# Patient Record
Sex: FEMALE | Race: WHITE | NOT HISPANIC OR LATINO | URBAN - METROPOLITAN AREA
[De-identification: names, ages, dates, MRNs, and addresses within clinical notes are randomized per-mention and may not be internally consistent; named-entity substitution may affect disease eponyms.]

---

## 2019-11-05 ENCOUNTER — OFFICE VISIT (OUTPATIENT)
Dept: NEUROLOGY | Facility: CLINIC | Age: 49
End: 2019-11-05

## 2019-11-05 VITALS
HEART RATE: 122 BPM | HEIGHT: 64 IN | SYSTOLIC BLOOD PRESSURE: 132 MMHG | OXYGEN SATURATION: 97 % | DIASTOLIC BLOOD PRESSURE: 84 MMHG | WEIGHT: 265 LBS | BODY MASS INDEX: 45.24 KG/M2

## 2019-11-05 DIAGNOSIS — G43.709 CHRONIC MIGRAINE WITHOUT AURA WITHOUT STATUS MIGRAINOSUS, NOT INTRACTABLE: Primary | ICD-10-CM

## 2019-11-05 DIAGNOSIS — G47.00 INSOMNIA, UNSPECIFIED TYPE: ICD-10-CM

## 2019-11-05 PROCEDURE — 99214 OFFICE O/P EST MOD 30 MIN: CPT | Performed by: PSYCHIATRY & NEUROLOGY

## 2019-11-05 RX ORDER — POTASSIUM CHLORIDE 750 MG/1
10 TABLET, FILM COATED, EXTENDED RELEASE ORAL DAILY
Refills: 10 | COMMUNITY
Start: 2019-10-16

## 2019-11-05 RX ORDER — MONTELUKAST SODIUM 10 MG/1
10 TABLET ORAL DAILY
Refills: 10 | COMMUNITY
Start: 2019-10-13

## 2019-11-05 RX ORDER — INDOMETHACIN 50 MG/1
CAPSULE ORAL
Refills: 1 | COMMUNITY
Start: 2019-10-14

## 2019-11-05 RX ORDER — CARBAMAZEPINE 200 MG/1
200 TABLET ORAL 2 TIMES DAILY
Refills: 3 | COMMUNITY
Start: 2019-10-13 | End: 2019-12-19 | Stop reason: HOSPADM

## 2019-11-05 RX ORDER — NARATRIPTAN 2.5 MG/1
TABLET ORAL
Refills: 0 | COMMUNITY
Start: 2019-08-16 | End: 2019-12-19 | Stop reason: SDUPTHER

## 2019-11-05 RX ORDER — LISINOPRIL 20 MG/1
20 TABLET ORAL DAILY
Refills: 10 | COMMUNITY
Start: 2019-10-13 | End: 2020-02-20 | Stop reason: ALTCHOICE

## 2019-11-05 RX ORDER — ROSUVASTATIN CALCIUM 40 MG/1
TABLET, COATED ORAL DAILY
Refills: 2 | COMMUNITY
Start: 2019-10-04

## 2019-11-05 RX ORDER — ALBUTEROL SULFATE 90 UG/1
2 AEROSOL, METERED RESPIRATORY (INHALATION) EVERY 4 HOURS PRN
COMMUNITY

## 2019-11-05 RX ORDER — GALCANEZUMAB 120 MG/ML
INJECTION, SOLUTION SUBCUTANEOUS
Refills: 0 | COMMUNITY
Start: 2019-09-27 | End: 2019-11-12

## 2019-11-05 RX ORDER — TIOTROPIUM BROMIDE 18 UG/1
CAPSULE ORAL; RESPIRATORY (INHALATION)
Refills: 10 | COMMUNITY
Start: 2019-10-15

## 2019-11-05 RX ORDER — TOPIRAMATE 25 MG/1
25 CAPSULE, EXTENDED RELEASE ORAL DAILY
Qty: 30 CAPSULE | Refills: 3 | Status: SHIPPED | OUTPATIENT
Start: 2019-11-05 | End: 2019-12-05

## 2019-11-05 RX ORDER — HYDROCHLOROTHIAZIDE 25 MG/1
25 TABLET ORAL DAILY
Refills: 10 | COMMUNITY
Start: 2019-10-16 | End: 2020-02-20 | Stop reason: ALTCHOICE

## 2019-11-05 RX ORDER — CYCLOBENZAPRINE HCL 10 MG
TABLET ORAL
Refills: 1 | COMMUNITY
Start: 2019-10-16

## 2019-11-05 NOTE — PROGRESS NOTES
Chief Complaint   Patient presents with   • Migraine       Patient ID: Rhonda Dong is a 49 y.o. female.    HPI: I have had the pleasure of seeing your patient today.  His me know she is a 49-year-old female with history of migraine headaches.  She states that she has had at least 21 days of headaches within the last 30 days.  She says that all 21 of them have been migrainous with some aspect of migraine symptoms including sensitivity to light and sound as well as nausea or vomiting.  She has received Aimovig injections 140 mg q. monthly for the past few months now.  She has tried Emgality in the past.  She was not experiencing significant improvement on that.  She does use naratriptan.  She says that the medication does help abort her headaches however it does usually return the next day.  I did notice that she takes Advil PM on a nightly basis.  She states that she has been having a lot of issues with insomnia.  She has had this problem for several years.  She has not seen a sleep specialist.  She has no previous history of sleep apnea.  She has tried Topamax in the past and says that she experience significant side effects so she discontinued that.  That was a few years ago now.  She did not try Trokendi however.  No new headache symptoms.  She was having some symptoms of pain from TMJ which we did try carbamazepine for however she says that that has not helped.  She is taking 200 mg twice daily.    The following portions of the patient's history were reviewed and updated as appropriate: allergies, current medications, past family history, past medical history, past social history, past surgical history and problem list.    Review of Systems   Constitutional: Positive for fatigue. Negative for activity change and appetite change.   HENT: Positive for nosebleeds. Negative for sneezing and trouble swallowing.    Eyes: Positive for photophobia and visual disturbance. Negative for pain.   Respiratory: Negative  for chest tightness, shortness of breath and wheezing.    Cardiovascular: Negative for chest pain, palpitations and leg swelling.   Gastrointestinal: Positive for nausea. Negative for abdominal pain and vomiting.   Endocrine: Negative for cold intolerance, heat intolerance and polydipsia.   Musculoskeletal: Negative for back pain, gait problem and neck pain.   Skin: Negative for color change, rash and wound.   Allergic/Immunologic: Positive for environmental allergies. Negative for food allergies and immunocompromised state.   Neurological: Positive for dizziness. Negative for tremors, seizures, syncope, facial asymmetry, speech difficulty, weakness, light-headedness, numbness and headaches.   Hematological: Negative for adenopathy. Does not bruise/bleed easily.   Psychiatric/Behavioral: Negative for agitation, behavioral problems, confusion, decreased concentration, dysphoric mood, hallucinations, self-injury, sleep disturbance and suicidal ideas. The patient is not nervous/anxious and is not hyperactive.       I reviewed and agree with the above review of systems completed by the medical assistant.    Vitals:    11/05/19 1103   BP: 132/84   Pulse: (!) 122   SpO2: 97%       Neurologic Exam     Mental Status   Oriented to person, place, and time.   Concentration: normal.   Level of consciousness: alert  Knowledge: consistent with education (No deficits found.).     Cranial Nerves     CN II   Visual fields full to confrontation.     CN III, IV, VI   Pupils are equal, round, and reactive to light.  Extraocular motions are normal.   CN III: no CN III palsy  CN VI: no CN VI palsy    CN V   Facial sensation intact.     CN VII   Facial expression full, symmetric.     CN VIII   CN VIII normal.     CN IX, X   CN IX normal.   CN X normal.     CN XI   CN XI normal.     CN XII   CN XII normal.     Motor Exam     Strength   Right neck flexion: 5/5  Left neck flexion: 5/5  Right neck extension: 5/5  Left neck extension:  5/5  Right deltoid: 5/5  Left deltoid: 5/5  Right biceps: 5/5  Left biceps: 5/5  Right triceps: 5/5  Left triceps: 5/5  Right wrist flexion: 5/5  Left wrist flexion: 5/5  Right wrist extension: 5/5  Left wrist extension: 5/5  Right interossei: 5/5  Left interossei: 5/5  Right abdominals: 5/5  Left abdominals: 5/5  Right iliopsoas: 5/5  Left iliopsoas: 5/5  Right quadriceps: 5/5  Left quadriceps: 5/5  Right hamstrin/5  Left hamstrin/5  Right glutei: 5/5  Left glutei: 5/5  Right anterior tibial: 5/5  Left anterior tibial: 5/5  Right posterior tibial: 5/5  Left posterior tibial: 5/5  Right peroneal: 5/5  Left peroneal: 5/5  Right gastroc: 5/5  Left gastroc: 5/5    Sensory Exam   Light touch normal.   Vibration normal.     Gait, Coordination, and Reflexes     Gait  Gait: normal    Reflexes   Right brachioradialis: 2+  Left brachioradialis: 2+  Right biceps: 2+  Left biceps: 2+  Right triceps: 2+  Left triceps: 2+  Right patellar: 2+  Left patellar: 2+  Right achilles: 2+  Left achilles: 2+  Right : 2+  Left : 2+Station is normal.       Physical Exam   Constitutional: She is oriented to person, place, and time. She appears well-developed and well-nourished.   HENT:   Head: Normocephalic and atraumatic.   Eyes: EOM are normal. Pupils are equal, round, and reactive to light.   Cardiovascular: Normal rate and regular rhythm.   Pulmonary/Chest: Breath sounds normal.   Musculoskeletal: Normal range of motion.   Neurological: She is oriented to person, place, and time. Gait normal.   Reflex Scores:       Tricep reflexes are 2+ on the right side and 2+ on the left side.       Bicep reflexes are 2+ on the right side and 2+ on the left side.       Brachioradialis reflexes are 2+ on the right side and 2+ on the left side.       Patellar reflexes are 2+ on the right side and 2+ on the left side.       Achilles reflexes are 2+ on the right side and 2+ on the left side.  Skin: Skin is warm.   Vitals  reviewed.      Procedures    Assessment/Plan: For the chronic headache cycle we will start her on Trokendi to see if we can use this without experiencing significant side effects.  We will start with 25 mg at night.  We will continue the Naratriptan.  We did talk about the Advil PM.  I would like for her to discontinue that if possible and use naproxen instead for headache.  With respect to the insomnia though we will refer her to a sleep specialist.  Return to clinic in 8 weeks.       Rhonda was seen today for migraine.    Diagnoses and all orders for this visit:    Chronic migraine without aura without status migrainosus, not intractable  -     Topiramate ER (TROKENDI XR) 25 MG capsule sustained-release 24 hr; Take 25 mg by mouth Daily for 30 days.    Insomnia, unspecified type  -     Ambulatory Referral to Sleep Medicine           Rodri Iniguez II, MD

## 2019-11-12 ENCOUNTER — TELEPHONE (OUTPATIENT)
Dept: NEUROLOGY | Facility: CLINIC | Age: 49
End: 2019-11-12

## 2019-11-12 DIAGNOSIS — G43.709 CHRONIC MIGRAINE WITHOUT AURA WITHOUT STATUS MIGRAINOSUS, NOT INTRACTABLE: Primary | ICD-10-CM

## 2019-11-12 NOTE — TELEPHONE ENCOUNTER
Pt just seen on 11/05/2019.     Needed refill on her aimovig injections.     Medication sent into pharmacy.

## 2019-12-09 ENCOUNTER — OFFICE VISIT (OUTPATIENT)
Dept: SLEEP MEDICINE | Facility: HOSPITAL | Age: 49
End: 2019-12-09

## 2019-12-09 VITALS
DIASTOLIC BLOOD PRESSURE: 91 MMHG | WEIGHT: 250.4 LBS | BODY MASS INDEX: 42.75 KG/M2 | SYSTOLIC BLOOD PRESSURE: 126 MMHG | HEART RATE: 100 BPM | OXYGEN SATURATION: 95 % | HEIGHT: 64 IN

## 2019-12-09 DIAGNOSIS — R06.83 SNORING: ICD-10-CM

## 2019-12-09 DIAGNOSIS — G47.00 INSOMNIA, UNSPECIFIED TYPE: ICD-10-CM

## 2019-12-09 DIAGNOSIS — G47.10 HYPERSOMNIA: Primary | ICD-10-CM

## 2019-12-09 DIAGNOSIS — E66.01 MORBID OBESITY (HCC): ICD-10-CM

## 2019-12-09 PROCEDURE — G0463 HOSPITAL OUTPT CLINIC VISIT: HCPCS

## 2019-12-09 NOTE — PROGRESS NOTES
[facility]  Telephone: [phone] / Fax: [phone] [place]  Telephone: [phone] / Fax: [phone] Wendy Vazquez    Referring Physician: Earlene Perry APRN  PCP: Earlene Perry APRN    Reason for consult:  sleep apnea    Rhonda Dong is a 49 y.o.female  was seen in the [facility] today for evaluation of sleep apnea.  She reports worsening insomnia in the past few years. She was started on Melatonin and Advil PM-but had to stop due to concern for SE and residual sleep maintenance insomnia.  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 She denies gasping for breath or choking. She denies witnessed apneas. She wakes up with a headache in the morning.  She feels excessively sweaty at night.  Her weight has been fluctuating over the years.  She is not considering any surgical repair for DNS. She takes Flexeril. Her sleep schedule is 11:30pm-5:30am. Her ESS is 5.    SH- [employer], former smoker age [age], 2 tea per day, 2 alcohol per week.    ROS- +recurrent nose bleeds, +ear pain, +hoarseness, +nasal congestion, +myalgias, swollen joints, +GERD, heat intolerance.    Rhonda Dong  has a past medical history of Arthritis, Asthma, CTS (carpal tunnel syndrome), Diabetes mellitus (CMS/HCC), Hypertension, Migraine, and Trigeminal neuralgia.    Current Medications:    Current Outpatient Medications:   •  albuterol sulfate HFA (PROVENTIL HFA) 108 (90 Base) MCG/ACT inhaler, Inhale 2 puffs Every 4 (Four) Hours As Needed for Wheezing., Disp: , Rfl:   •  carBAMazepine (TEGretol) 200 MG tablet, Take 200 mg by mouth 2 (Two) Times a Day., Disp: , Rfl: 3  •  cyclobenzaprine (FLEXERIL) 10 MG tablet, TK 1 T PO TID PRN, Disp: , Rfl: 1  •  Erenumab-aooe (AIMOVIG, 140 MG DOSE,) 70 MG/ML prefilled syringe, Inject 2 mL under the skin into the appropriate area as directed Every 30 (Thirty) Days for 30 days., Disp: 2 mL, Rfl: 5  •  hydroCHLOROthiazide (HYDRODIURIL) 25 MG tablet,  "Take 25 mg by mouth Daily., Disp: , Rfl: 10  •  Ibuprofen-diphenhydrAMINE Cit (ADVIL PM PO), Take  by mouth., Disp: , Rfl:   •  indomethacin (INDOCIN) 50 MG capsule, TK 2 CS PO BID, Disp: , Rfl: 1  •  lisinopril (PRINIVIL,ZESTRIL) 20 MG tablet, Take 20 mg by mouth Daily., Disp: , Rfl: 10  •  Melatonin 3 MG capsule, Take  by mouth., Disp: , Rfl:   •  metFORMIN (GLUCOPHAGE) 500 MG tablet, Take 500 mg by mouth 2 (Two) Times a Day., Disp: , Rfl: 10  •  montelukast (SINGULAIR) 10 MG tablet, Take 10 mg by mouth Daily., Disp: , Rfl: 10  •  naratriptan (AMERGE) 2.5 MG tablet, TK 1 T PO AOS OF HEADACHE. MAY REPEAT 1 TIME IN 4 H, Disp: , Rfl: 0  •  potassium chloride (K-DUR) 10 MEQ CR tablet, Take 10 mEq by mouth Daily., Disp: , Rfl: 10  •  rosuvastatin (CRESTOR) 40 MG tablet, Take  by mouth Daily., Disp: , Rfl: 2  •  SPIRIVA HANDIHALER 18 MCG per inhalation capsule, INL THE CONTENTS OF 1 C VIA INHALATION DEVICE D, Disp: , Rfl: 10    I have reviewed Past Medical History, Past Surgical History, Medication List, Social History and Family History as entered in Sleep Questionnaire and EPIC.    ESS  5   Vital Signs /91   Pulse 100   Ht 162.6 cm (64\")   Wt 114 kg (250 lb 6.4 oz)   SpO2 95%   BMI 42.98 kg/m²  Body mass index is 42.98 kg/m².    General Alert and oriented. No acute distress noted   Pharynx/Throat Class IV Mallampati airway, large tongue, no evidence of redundant lateral pharyngeal tissue. No oral lesions. No thrush. Moist mucous membranes.   Head Normocephalic. Symmetrical. Atraumatic.    Nose No septal deviation. No drainage   Chest Wall Normal shape. Symmetric expansion with respiration. No tenderness.   Neck Trachea midline, no thyromegaly or adenopathy    Lungs Clear to auscultation bilaterally. No wheezes. No rhonchi. No rales. Respirations regular, even and unlabored.   Heart Regular rhythm and normal rate. Normal S1 and S2. No murmur   Abdomen Soft, non-tender and non-distended. Normal bowel sounds. " No masses.   Extremities Moves all extremities well. No edema   Psychiatric Normal mood and affect.       Impression:  1. Hypersomnia    2. Insomnia, unspecified type    3. Snoring    4. Morbid obesity (CMS/HCC)          Plan:  I discussed the pathophysiology of obstructive sleep apnea with the patient.  We discussed the adverse outcomes associated with untreated sleep-disordered breathing.  We discussed treatment modalities of obstructive sleep apnea including CPAP device as well as oral mandibular advancement device. Sleep study will be scheduled to establish definitive diagnosis of sleep disorder breathing.  Weight loss will be strongly beneficial in order to reduce the severity of sleep-disordered breathing.  Patient has narrow oropharyngeal structure.  Caution during activities that require prolonged concentration is strongly advised.  Patient will be notified of sleep study results after sleep study is completed.  If sleep apnea is only mild,  oral mandibular advancement device may be one of the treatment options.  However if sleep apnea is moderately severe, CPAP treatment will be strongly encouraged.  The patient is not opposed to treatment with CPAP device if we confirm significant obstructive sleep apnea on polysomnography.     If HST is negative for KRUPA-recommend CBT-I by Dr. Sanchez.    Thank you for allowing me to participate in your patient's care.    The patient will follow up with Dr. Short after completion of polysomnography.    CLAY Deal  [facility]  Phone: [phone]      Part of this note may be an electronic transcription/translation of spoken language to printed text using the Dragon Dictation System. Some errors may exist even though the document was edited.

## 2019-12-19 ENCOUNTER — OFFICE VISIT (OUTPATIENT)
Dept: NEUROLOGY | Facility: CLINIC | Age: 49
End: 2019-12-19

## 2019-12-19 VITALS
DIASTOLIC BLOOD PRESSURE: 88 MMHG | OXYGEN SATURATION: 98 % | BODY MASS INDEX: 43.02 KG/M2 | HEART RATE: 105 BPM | HEIGHT: 64 IN | SYSTOLIC BLOOD PRESSURE: 138 MMHG | WEIGHT: 252 LBS

## 2019-12-19 DIAGNOSIS — G43.009 MIGRAINE WITHOUT AURA AND WITHOUT STATUS MIGRAINOSUS, NOT INTRACTABLE: Primary | ICD-10-CM

## 2019-12-19 DIAGNOSIS — M26.622 ARTHRALGIA OF LEFT TEMPOROMANDIBULAR JOINT: ICD-10-CM

## 2019-12-19 PROCEDURE — 99214 OFFICE O/P EST MOD 30 MIN: CPT | Performed by: NURSE PRACTITIONER

## 2019-12-19 RX ORDER — RANITIDINE 150 MG/1
150 TABLET ORAL DAILY
COMMUNITY
Start: 2019-10-01 | End: 2021-03-04 | Stop reason: HOSPADM

## 2019-12-19 RX ORDER — NARATRIPTAN 2.5 MG/1
2.5 TABLET ORAL ONCE AS NEEDED
Qty: 9 TABLET | Refills: 0 | Status: SHIPPED | OUTPATIENT
Start: 2019-12-19 | End: 2020-08-27 | Stop reason: SDUPTHER

## 2019-12-19 RX ORDER — ERENUMAB-AOOE 140 MG/ML
INJECTION, SOLUTION SUBCUTANEOUS
COMMUNITY
Start: 2019-12-12 | End: 2020-05-18

## 2019-12-19 RX ORDER — LAMOTRIGINE 25 MG/1
TABLET ORAL
Qty: 54 TABLET | Refills: 2 | Status: SHIPPED | OUTPATIENT
Start: 2019-12-19 | End: 2020-01-13

## 2019-12-19 RX ORDER — OMEPRAZOLE 20 MG/1
20 CAPSULE, DELAYED RELEASE ORAL 2 TIMES DAILY
COMMUNITY
Start: 2019-10-01

## 2019-12-19 NOTE — PROGRESS NOTES
DOS: 2019  NAME: Rhonda Dong   : 1970  PCP: Earlene Perry APRN    Chief Complaint   Patient presents with   • Migraine      SUBJECTIVE  Neurological Problem:  49 y.o. RHW female with migraines, TMJ, DM, HTN, HLD, asthma, chronic right knee pain and h/o uveitis, cataracts and CTS (s/p surgery on the right) who presents today for f/u of migraines and TMJ (on left). She is unaccompanied. Patient and problem are new to examiner. History is provided by patient and review of records that are summarized below.     Interval History:   Ms. Dong has been previously treated by Dr. Iniguez, Review of records from back to 2016 were reviewed. She has evidently been on  2016: Zonegran, Zomig, Riboflavin, magnesium, Medrol dosepak, imitrex for abortive, Lyrica started  2017: Lyrica, trial of cambia, Tegretol 200 mg for TMJ, Amitriptyline, naratriptan for abortive, medrol dosepak  2018: Zonegran, sumatriptain, naratriptan, Tegretol, Aimovig 140 and imitrex (that was discontinued d/t dizziness); Also with reports of having tried propanolol and cymbalta   2019: Continued on Aimovig 140 mg, naratriptan (for migraines)  and Tegretol (for TMJ). Due to insurance reasons, had to switch to emgality with worsening of migraine days.     She was last seen by Dr. Iniguez in follow-up in 2019 reporting at least 21 days of headaches in the last 30 days, features consistent with migraines and being treated with Aimovig 140 mg and naratriptan for abortive. She also continued on Tegretol 200 mg BID for her TMJ without improvement. Trokendi was added, she was instructed to d/c Advil PM and use naproxen and to see sleep specialist.     She presents today and continues on Aimovig for prevention and naratriptan for abortive with good control over the previous month, reporting only 3 migraines. Of more concern today is her continued TMJ pain. She was tried on topamax for TMJ but had adverse side-effects and transitioned to  Vivek at her last appointment but she then had similar side effects and she did not tolerate.   She has also reports trying neurontin and lyrica. She has been using muscle relaxants to help mitigate TMJ pain as well per her PCP. [facility] and [facility] bite block. She follows up with her PCP four routine lab work, review of most recent labs done in June show an A1C of 6.1, CMP essentially normal, lipid panel with elevated TG, CBC unremarkable, TSH 1.78, B12 629, Vit D low at 18.6 (PCP recommended supplementation).     She denies smoking (20-year history). Rare alcohol use.   Family history, mom with migraines and son with migraines.     Review of Systems:Review of Systems   Constitutional: Negative for activity change, appetite change and fatigue.   HENT: Negative for ear pain, facial swelling and trouble swallowing.    Eyes: Negative for photophobia, pain and visual disturbance.   Musculoskeletal: Negative for back pain, gait problem and neck pain.   Allergic/Immunologic: Negative for environmental allergies, food allergies and immunocompromised state.   Neurological: Positive for headaches (down to 3 migraines a month). Negative for dizziness, tremors, seizures, syncope, facial asymmetry, speech difficulty, weakness, light-headedness and numbness.   Hematological: Negative for adenopathy. Does not bruise/bleed easily.   Psychiatric/Behavioral: Negative for agitation, behavioral problems, confusion, decreased concentration, dysphoric mood, hallucinations, self-injury, sleep disturbance and suicidal ideas. The patient is not nervous/anxious and is not hyperactive.     Above ROS reviewed    The following portions of the patient's history were reviewed and updated as appropriate: allergies, current medications, past family history, past medical history, past social history, past surgical history and problem list.    Current Medications:   Current Outpatient Medications:   •  AIMOVIG 140  MG/ML solution auto-injector, INJECT 1 ML UNDER THE SKIN Q 30 DAYS, Disp: , Rfl:   •  albuterol sulfate HFA (PROVENTIL HFA) 108 (90 Base) MCG/ACT inhaler, Inhale 2 puffs Every 4 (Four) Hours As Needed for Wheezing., Disp: , Rfl:   •  cyclobenzaprine (FLEXERIL) 10 MG tablet, TK 1 T PO TID PRN, Disp: , Rfl: 1  •  hydroCHLOROthiazide (HYDRODIURIL) 25 MG tablet, Take 25 mg by mouth Daily., Disp: , Rfl: 10  •  indomethacin (INDOCIN) 50 MG capsule, TK 2 CS PO BID, Disp: , Rfl: 1  •  lisinopril (PRINIVIL,ZESTRIL) 20 MG tablet, Take 20 mg by mouth Daily., Disp: , Rfl: 10  •  metFORMIN (GLUCOPHAGE) 500 MG tablet, Take 500 mg by mouth 2 (Two) Times a Day., Disp: , Rfl: 10  •  montelukast (SINGULAIR) 10 MG tablet, Take 10 mg by mouth Daily., Disp: , Rfl: 10  •  naratriptan (AMERGE) 2.5 MG tablet, Take 1 tablet by mouth 1 (One) Time As Needed for Migraine for up to 1 dose. 2.5 mg at onset of headache, may repeat in 4 hours if needed, Disp: 9 tablet, Rfl: 0  •  omeprazole (priLOSEC) 20 MG capsule, Take 20 mg by mouth Daily., Disp: , Rfl:   •  potassium chloride (K-DUR) 10 MEQ CR tablet, Take 10 mEq by mouth Daily., Disp: , Rfl: 10  •  raNITIdine (ZANTAC) 150 MG tablet, Take 150 mg by mouth Daily., Disp: , Rfl:   •  rosuvastatin (CRESTOR) 40 MG tablet, Take  by mouth Daily., Disp: , Rfl: 2  •  SPIRIVA HANDIHALER 18 MCG per inhalation capsule, INL THE CONTENTS OF 1 C VIA INHALATION DEVICE D, Disp: , Rfl: 10  •  lamoTRIgine (LAMICTAL) 25 MG tablet, Take 1 tablet by mouth x 1 week and increase by 25 mg every week for 3 weeks., Disp: 54 tablet, Rfl: 2      OBJECTIVE  Vitals:    12/19/19 0747   BP: 138/88   Pulse: 105   SpO2: 98%     Body mass index is 43.23 kg/m².    Diagnostics:    Laboratory Results:           Physical Examination:   General Appearance:   Well developed, overweight, well groomed, alert, and cooperative.  HEENT: Normocephalic.    Neck and Spine: Normal range of motion.  Normal alignment. No mass or tenderness. No  bruits.  Cardiac: Regular rate and rhythm.   Peripheral Vasculature: Radial pulses are equal and symmetric. No signs of distal embolization.  Extremities:    No edema or deformities. Normal joint ROM.  Skin:    No rashes or birth marks.  Psychiatric:    Normal mood and affect.    Neurological examination:  Higher Integrative  Function: Oriented to time, place and person. Normal registration, recall (3/3), attention span and concentration. Normal language including comprehension, spontaneous speech, repetition, reading, naming and vocabulary. No neglect. Normal fund of knowledge and higher integrative function.  CN II: Pupils are equal, round, and reactive to light. Normal visual acuity and visual fields.    CN III IV VI: Extraocular movements are full without nystagmus.   CN V: Normal facial sensation and strength of muscles of mastication.  CN VII: Facial movements are symmetric. No weakness.  CN VIII:   Auditory acuity is normal.  CN IX & X:   Symmetric palatal movement.  CN XI: Sternocleidomastoid and trapezius are normal.  No weakness.  CN XII:   The tongue is midline.  No atrophy or fasciculations.  Motor: Normal muscle strength, bulk and tone in upper and lower extremities.  No fasciculations, rigidity, spasticity, or abnormal movements.  Reflexes: 2+ in the upper and lower extremities.   Sensation: Normal to light touch, vibration, temperature, and proprioception in arms and legs.   Station and Gait: Normal gait and station.    Coordination: Finger to nose test shows no dysmetria.  Heel to shin normal.    Impression:  Ms. Dong presents for f/u of migraines and TMJ. She has tried multiple medications (listed above) for migraine therapy but is doing well currently on Aimovig 140 mg for prevention and naratriptan for abortive therapy. She unfortunately has failed Tegretol and  topiramate/Trokendi for TMJ relief. Will trial lamictal, reviewed possible side-effects including Arnaud Mike's syndrome. She was  instructed to discontinue if she develops a rash. Would also consider complementary therapies, possibly compounded topicals. She will f/u in 2 months.     Plan:     1. Chronic migraine w/o aura  Aimovig 140 mg for prevention  Naratriptan 2.5 mg prn for abortive therapy  Avoid migraine triggers  Keep well hydrated    2. TMJ pain  Muscle relaxants per PCP  Trial of lamictal 25 mg qd, increase by 25 mg q weekly  Counseled on possible side-effects, to D/C if signs of rash    F/U in 2 months  There are no diagnoses linked to this encounter.    Coding      Dictated using Dragon

## 2020-01-06 DIAGNOSIS — G47.33 OSA (OBSTRUCTIVE SLEEP APNEA): Primary | ICD-10-CM

## 2020-01-13 ENCOUNTER — TELEPHONE (OUTPATIENT)
Dept: NEUROLOGY | Facility: CLINIC | Age: 50
End: 2020-01-13

## 2020-01-13 RX ORDER — LAMOTRIGINE 100 MG/1
100 TABLET ORAL DAILY
Qty: 30 TABLET | Refills: 2 | Status: SHIPPED | OUTPATIENT
Start: 2020-01-13 | End: 2020-04-13 | Stop reason: SDUPTHER

## 2020-01-13 NOTE — TELEPHONE ENCOUNTER
Pt sent msg stating that the lamictal is doing good and that she is up to 3 pills, but is asking/stating that she believes it should be upped to four. She is currently on 75 mg's daily. Last seen in office 12/19/19 and was instructed to increase 25 mg lamictal by one pill every week for 3 weeks.     Please review and advise.

## 2020-01-15 ENCOUNTER — TELEPHONE (OUTPATIENT)
Dept: SLEEP MEDICINE | Facility: HOSPITAL | Age: 50
End: 2020-01-15

## 2020-01-16 NOTE — TELEPHONE ENCOUNTER
Tech called home #, no answer. Left vm for patient inquiring if would be able to make it in. Tech left office number to call or if needed to reschedule. No show letter sent as well. MAB

## 2020-02-14 ENCOUNTER — TELEPHONE (OUTPATIENT)
Dept: NEUROLOGY | Facility: CLINIC | Age: 50
End: 2020-02-14

## 2020-02-20 ENCOUNTER — OFFICE VISIT (OUTPATIENT)
Dept: NEUROLOGY | Facility: CLINIC | Age: 50
End: 2020-02-20

## 2020-02-20 VITALS
SYSTOLIC BLOOD PRESSURE: 116 MMHG | OXYGEN SATURATION: 99 % | HEIGHT: 64 IN | DIASTOLIC BLOOD PRESSURE: 84 MMHG | BODY MASS INDEX: 43.02 KG/M2 | HEART RATE: 101 BPM | WEIGHT: 252 LBS

## 2020-02-20 DIAGNOSIS — M26.622 ARTHRALGIA OF LEFT TEMPOROMANDIBULAR JOINT: ICD-10-CM

## 2020-02-20 DIAGNOSIS — G43.009 MIGRAINE WITHOUT AURA AND WITHOUT STATUS MIGRAINOSUS, NOT INTRACTABLE: Primary | ICD-10-CM

## 2020-02-20 PROCEDURE — 99213 OFFICE O/P EST LOW 20 MIN: CPT | Performed by: NURSE PRACTITIONER

## 2020-02-20 RX ORDER — LISINOPRIL AND HYDROCHLOROTHIAZIDE 25; 20 MG/1; MG/1
1 TABLET ORAL DAILY
COMMUNITY

## 2020-02-20 NOTE — PROGRESS NOTES
DOS: 2020  NAME: Rhonda Dong   : 1970  PCP: Mellisa Larry APRN    Chief Complaint   Patient presents with   • Migraine      SUBJECTIVE  Neurological Problem:  49 y.o. RHW female with migraines, TMJ, DM, HTN, HLD, asthma, chronic right knee pain and h/o uveitis, b/l cataracts and CTS (s/p surgery on the right) who presents today for f/u of migraines and TMJ (on left). She is unaccompanied.     Interval History:   **For full history please see progress note dated 19.    Ms. Dong presents for f/u of migraines and TMJ. She has tried multiple medications in the past for migraine therapy but is doing well currently on Aimovig 140 mg for prevention and naratriptan for abortive therapy.  Reports having only 2 migraines in the last month that responded well to naratriptan. For TMJ, she wears a bite block, has failed Tegretol and topiramate/Trokendi and was started on Lamictal at her last visit in December, titrated up to 100 mg daily and reports significant improvement.  She denies any side effects, no rash.    She is currently being followed by orthopedics for right knee pain, likely needing surgery.  She follows up regularly with ophthalmology for her history of uveitis.  She has established with a nutritionist and has lost weight since her last visit, denies any changes in her health since her last visit.  Overall states she is doing very well.    She follows up with her PCP four routine lab work, review of most recent labs done in 2019 show an A1C of 6.1, CMP essentially normal, lipid panel with elevated TG, CBC unremarkable, TSH 1.78, B12 629, Vit D low at 18.6 (PCP recommended supplementation).      Review of Systems:Review of Systems   Constitutional: Negative for activity change, appetite change and fatigue.   HENT: Negative for ear pain, facial swelling and trouble swallowing.    Eyes: Negative for photophobia, pain and visual disturbance.   Musculoskeletal: Negative for back  pain, gait problem and neck pain.   Allergic/Immunologic: Negative for environmental allergies, food allergies and immunocompromised state.   Neurological: Negative for dizziness, tremors, seizures, syncope, facial asymmetry, speech difficulty, weakness, light-headedness, numbness and headaches (1-2 monthly).   Hematological: Negative for adenopathy. Does not bruise/bleed easily.   Psychiatric/Behavioral: Negative for agitation, behavioral problems, confusion, decreased concentration, dysphoric mood, hallucinations, self-injury, sleep disturbance and suicidal ideas. The patient is not nervous/anxious and is not hyperactive.     Above ROS reviewed    The following portions of the patient's history were reviewed and updated as appropriate: allergies, current medications, past family history, past medical history, past social history, past surgical history and problem list.    Current Medications:   Current Outpatient Medications:   •  AIMOVIG 140 MG/ML solution auto-injector, INJECT 1 ML UNDER THE SKIN Q 30 DAYS, Disp: , Rfl:   •  albuterol sulfate HFA (PROVENTIL HFA) 108 (90 Base) MCG/ACT inhaler, Inhale 2 puffs Every 4 (Four) Hours As Needed for Wheezing., Disp: , Rfl:   •  cyclobenzaprine (FLEXERIL) 10 MG tablet, TK 1 T PO TID PRN, Disp: , Rfl: 1  •  indomethacin (INDOCIN) 50 MG capsule, TK 2 CS PO BID, Disp: , Rfl: 1  •  lamoTRIgine (LAMICTAL) 100 MG tablet, Take 1 tablet by mouth Daily., Disp: 30 tablet, Rfl: 2  •  lisinopril-hydrochlorothiazide (PRINZIDE,ZESTORETIC) 20-25 MG per tablet, Take 1 tablet by mouth Daily., Disp: , Rfl:   •  metFORMIN (GLUCOPHAGE) 500 MG tablet, Take 500 mg by mouth 2 (Two) Times a Day., Disp: , Rfl: 10  •  montelukast (SINGULAIR) 10 MG tablet, Take 10 mg by mouth Daily., Disp: , Rfl: 10  •  naratriptan (AMERGE) 2.5 MG tablet, Take 1 tablet by mouth 1 (One) Time As Needed for Migraine for up to 1 dose. 2.5 mg at onset of headache, may repeat in 4 hours if needed, Disp: 9 tablet, Rfl:  0  •  omeprazole (priLOSEC) 20 MG capsule, Take 20 mg by mouth Daily., Disp: , Rfl:   •  potassium chloride (K-DUR) 10 MEQ CR tablet, Take 10 mEq by mouth Daily., Disp: , Rfl: 10  •  raNITIdine (ZANTAC) 150 MG tablet, Take 150 mg by mouth Daily., Disp: , Rfl:   •  rosuvastatin (CRESTOR) 40 MG tablet, Take  by mouth Daily., Disp: , Rfl: 2  •  SPIRIVA HANDIHALER 18 MCG per inhalation capsule, INL THE CONTENTS OF 1 C VIA INHALATION DEVICE D, Disp: , Rfl: 10      OBJECTIVE  Vitals:    02/20/20 0802   BP: 116/84   Pulse: 101   SpO2: 99%     Body mass index is 43.23 kg/m².    Diagnostics:    Laboratory Results:           Physical Exam:  GENERAL: NAD, obese  HEENT: Normocephalic, atraumatic   COR: RRR  Resp: Even and unlabored  Extremities: Decreased ROM of right knee  Psychiatric: Normal mood and affect.    Neurological:   MS: AO. Language normal. No neglect. Higher integrative function normal  CN: II-XII normal  Motor: Normal strength and tone throughout.  Sensory: Intact to light touch in arms and legs  Station and Gait: Normal gait and station.    Coordination: Normal finger to nose.    Impression:  Ms. Dong presents for f/u of migraines and TMJ. She has tried multiple medications (listed above) for migraine therapy but is doing well currently on Aimovig 140 mg for prevention and naratriptan for abortive therapy.  In regards to her TMJ, she has responded well to Lamictal, currently at 100 mg daily.  She will continue current medication regimen.  Will request most recent lab work from her PCP.  She will follow-up here in 6 months, sooner if symptoms warrant.      Plan:     1. Chronic migraine w/o aura  Aimovig 140 mg for prevention  Naratriptan 2.5 mg prn for abortive therapy  Avoid migraine triggers  Keep well hydrated     2. TMJ pain  Muscle relaxants per PCP  Continue Lamictal 100 mg daily    F/U in 6 months.  Rhonda was seen today for migraine.    Diagnoses and all orders for this visit:    Migraine without  aura and without status migrainosus, not intractable    Arthralgia of left temporomandibular joint        Coding      Dictated using Dragon

## 2020-04-13 RX ORDER — LAMOTRIGINE 100 MG/1
100 TABLET ORAL DAILY
Qty: 30 TABLET | Refills: 2 | Status: SHIPPED | OUTPATIENT
Start: 2020-04-13 | End: 2020-07-13 | Stop reason: SDUPTHER

## 2020-04-13 NOTE — TELEPHONE ENCOUNTER
Pt last seen 2/20/2020. Follow up scheduled 8/20/2020.       Quantity 30 for 30 days.      Please review and approve or advise.     Thank you.

## 2020-05-18 RX ORDER — ERENUMAB-AOOE 140 MG/ML
INJECTION, SOLUTION SUBCUTANEOUS
Qty: 1 ML | Refills: 3 | Status: SHIPPED | OUTPATIENT
Start: 2020-05-18 | End: 2020-09-03

## 2020-07-13 RX ORDER — LAMOTRIGINE 100 MG/1
100 TABLET ORAL DAILY
Qty: 30 TABLET | Refills: 2 | Status: SHIPPED | OUTPATIENT
Start: 2020-07-13 | End: 2020-11-09 | Stop reason: SDUPTHER

## 2020-08-27 ENCOUNTER — OFFICE VISIT (OUTPATIENT)
Dept: NEUROLOGY | Facility: CLINIC | Age: 50
End: 2020-08-27

## 2020-08-27 VITALS
OXYGEN SATURATION: 99 % | DIASTOLIC BLOOD PRESSURE: 88 MMHG | SYSTOLIC BLOOD PRESSURE: 138 MMHG | BODY MASS INDEX: 40.97 KG/M2 | HEIGHT: 64 IN | WEIGHT: 240 LBS | HEART RATE: 101 BPM

## 2020-08-27 DIAGNOSIS — M26.622 ARTHRALGIA OF LEFT TEMPOROMANDIBULAR JOINT: ICD-10-CM

## 2020-08-27 DIAGNOSIS — G47.00 INSOMNIA, UNSPECIFIED TYPE: ICD-10-CM

## 2020-08-27 DIAGNOSIS — G43.009 MIGRAINE WITHOUT AURA AND WITHOUT STATUS MIGRAINOSUS, NOT INTRACTABLE: Primary | ICD-10-CM

## 2020-08-27 PROCEDURE — 99214 OFFICE O/P EST MOD 30 MIN: CPT | Performed by: NURSE PRACTITIONER

## 2020-08-27 RX ORDER — NARATRIPTAN 2.5 MG/1
5 TABLET ORAL ONCE AS NEEDED
Qty: 9 TABLET | Refills: 5 | Status: SHIPPED | OUTPATIENT
Start: 2020-08-27 | End: 2021-12-02 | Stop reason: SDUPTHER

## 2020-08-27 RX ORDER — OXYCODONE HYDROCHLORIDE AND ACETAMINOPHEN 5; 325 MG/1; MG/1
TABLET ORAL
COMMUNITY
Start: 2020-06-13 | End: 2020-08-27

## 2020-08-27 RX ORDER — APIXABAN 2.5 MG/1
2.5 TABLET, FILM COATED ORAL 2 TIMES DAILY
COMMUNITY
Start: 2020-06-13 | End: 2020-08-27

## 2020-08-27 RX ORDER — ACETAMINOPHEN 500 MG
500 TABLET ORAL EVERY 6 HOURS PRN
COMMUNITY

## 2020-08-27 RX ORDER — NORTRIPTYLINE HYDROCHLORIDE 25 MG/1
25 CAPSULE ORAL NIGHTLY
Qty: 30 CAPSULE | Refills: 0 | Status: SHIPPED | OUTPATIENT
Start: 2020-08-27 | End: 2020-09-28 | Stop reason: SDUPTHER

## 2020-08-27 NOTE — PROGRESS NOTES
DOS: 2020  NAME: Rhonda Dong   : 1970  PCP: Mellisa Larry APRN    Chief Complaint   Patient presents with   • Migraine      SUBJECTIVE  Neurological Problem:  50 y.o. RHW female with migraines, TMJ, DM, HTN, HLD, asthma, chronic right knee pain and h/o uveitis, b/l cataracts and CTS (s/p surgery on the right) who presents today for f/u of migraines and TMJ (on left). She is unaccompanied.     Interval History:   **For full history please see progress note dated 19.     Ms. Dong presents for f/u of migraines and TMJ. She has tried multiple medications in the past for migraine therapy but has done well on Aimovig 140 mg for prevention and naratriptan for abortive therapy.  For TMJ, she wears a bite block, has failed Tegretol and topiramate/Trokendi and was started on Lamictal  in December, titrated up to 100 mg daily, no adverse effects, no rash.  She was last seen in 2020, doing well on the above regimen.    She presents today, had a recent right knee replacement, doing well, back to work and regular activties.  She reports having some difficulty with sleep, has tried meditation, good sleep hygiene habits and OTCs including melatonin, Tylenol PM. Also some worsening of migraines, increased frequency, had to use rescue mediction 6 times in last two months, on one occaision had to take three dose, thinks it is tied to sleep issues. Was previously tested for KRUPA, was negative. Plans to discuss insomnia with PCP.  Regarding her TMJ, doing well on Lamictal at 100 mg daily.  She denies any other changes in her health since her last visit.  She has changed her diet to a Mediterranean diet with the help of a nutritionist, is using [employer].    Review of Systems:Review of Systems   Constitutional: Negative for activity change, appetite change and fatigue.   HENT: Negative for ear pain, tinnitus and trouble swallowing.    Eyes: Positive for pain. Negative for photophobia and visual  disturbance.   Musculoskeletal: Positive for neck pain. Negative for back pain and gait problem.   Neurological: Positive for headaches. Negative for dizziness, tremors, seizures, syncope, facial asymmetry, speech difficulty, weakness, light-headedness and numbness.   Psychiatric/Behavioral: Positive for sleep disturbance. Negative for agitation, behavioral problems, confusion, decreased concentration, dysphoric mood, hallucinations, self-injury and suicidal ideas. The patient is not nervous/anxious and is not hyperactive.     Above ROS reviewed    The following portions of the patient's history were reviewed and updated as appropriate: allergies, current medications, past family history, past medical history, past social history, past surgical history and problem list.    Current Medications:   Current Outpatient Medications:   •  acetaminophen (TYLENOL) 500 MG tablet, Take 500 mg by mouth Every 6 (Six) Hours As Needed for Mild Pain ., Disp: , Rfl:   •  AIMOVIG 140 MG/ML solution auto-injector, INJECT 1 ML UNDER THE SKIN EVERY 30 DAYS, Disp: 1 mL, Rfl: 3  •  albuterol sulfate HFA (PROVENTIL HFA) 108 (90 Base) MCG/ACT inhaler, Inhale 2 puffs Every 4 (Four) Hours As Needed for Wheezing., Disp: , Rfl:   •  cyclobenzaprine (FLEXERIL) 10 MG tablet, TK 1 T PO TID PRN, Disp: , Rfl: 1  •  indomethacin (INDOCIN) 50 MG capsule, TK 2 CS PO BID, Disp: , Rfl: 1  •  lamoTRIgine (LaMICtal) 100 MG tablet, Take 1 tablet by mouth Daily., Disp: 30 tablet, Rfl: 2  •  lisinopril-hydrochlorothiazide (PRINZIDE,ZESTORETIC) 20-25 MG per tablet, Take 1 tablet by mouth Daily., Disp: , Rfl:   •  metFORMIN (GLUCOPHAGE) 500 MG tablet, Take 500 mg by mouth 2 (Two) Times a Day., Disp: , Rfl: 10  •  montelukast (SINGULAIR) 10 MG tablet, Take 10 mg by mouth Daily., Disp: , Rfl: 10  •  naratriptan (AMERGE) 2.5 MG tablet, Take 2 tablets by mouth 1 (One) Time As Needed for Migraine. 2.5 mg at onset of headache, may repeat in 4 hours if needed, Disp: 9  tablet, Rfl: 5  •  omeprazole (priLOSEC) 20 MG capsule, Take 20 mg by mouth Daily., Disp: , Rfl:   •  potassium chloride (K-DUR) 10 MEQ CR tablet, Take 10 mEq by mouth Daily., Disp: , Rfl: 10  •  raNITIdine (ZANTAC) 150 MG tablet, Take 150 mg by mouth Daily., Disp: , Rfl:   •  rosuvastatin (CRESTOR) 40 MG tablet, Take  by mouth Daily., Disp: , Rfl: 2  •  SPIRIVA HANDIHALER 18 MCG per inhalation capsule, INL THE CONTENTS OF 1 C VIA INHALATION DEVICE D, Disp: , Rfl: 10  •  ELIQUIS 2.5 MG tablet tablet, Take 2.5 mg by mouth 2 (Two) Times a Day., Disp: , Rfl:   •  nortriptyline (PAMELOR) 25 MG capsule, Take 1 capsule by mouth Every Night for 30 days., Disp: 30 capsule, Rfl: 0  •  oxyCODONE-acetaminophen (PERCOCET) 5-325 MG per tablet, TK 1-2 TS PO Q 4 H PRN P, Disp: , Rfl:   **I did not stop or change the above medications.  Patient's medication list was updated to reflect medications they have reported as currently taking, including medication changes made by other providers.    OBJECTIVE  Vitals:    08/27/20 0928   BP: 138/88   Pulse: 101   SpO2: 99%     Body mass index is 41.18 kg/m².    Diagnostics:    Laboratory Results:         [facility]: Glucose 103, BUN 28, creatinine 1.21, sodium 136, potassium 4.2,      Physical Exam:  GENERAL: NAD, obese  HEENT: Normocephalic, atraumatic   COR: RRR  Resp: Even and unlabored  Extremities: Right knee with fresh surgical scar, well healed   Psychiatric: Normal mood and affect.    Neurological:   MS: AO. Language normal. No neglect. Follows all commands.  CN: II-XII grossly normal  Motor: Normal strength and tone throughout.  Sensory: Intact to light touch in arms and legs  Station and Gait: Normal gait and station.    Coordination: Normal finger to nose bilaterally    Impression:  Ms. Dong presents for f/u of migraines and TMJ, doing fairly well on current regimen of Aimovig 140 mg for prevention and naratriptan for abortive therapy; however, experiencing  an increase in frequency of migraines in the last several months likely tied to poor sleep.  Will trial nortriptyline 25 mg nightly for prevention as the sedating effects may help her sleep as well as her migraines.  She also plans to follow-up with her PCP for insomnia.  She will follow-up here in 6 months, sooner if symptoms warrant.    Plan:     1.  Chronic migraine w/o aura  Trial of nortriptyline 25 mg qhs  Aimovig 140 mg monthly for prevention  Naratriptan 5 mg prn for abortive therapy  Avoid migraine triggers  Keep well hydrated    2. TMJ pain  Muscle relaxants per PCP  Continue lamictal 100 mg daily    F/U in 6 months    Greater than 50% of this 25-minute visit was spent counseling patient regarding diagnosis, review of diagnostics, medication treatment options including purpose, risk, benefits, possible side effects as well as recommended lifestyle modifications and preventative measures.  Rhonda was seen today for migraine.    Diagnoses and all orders for this visit:    Migraine without aura and without status migrainosus, not intractable    Other orders  -     nortriptyline (PAMELOR) 25 MG capsule; Take 1 capsule by mouth Every Night for 30 days.  -     naratriptan (AMERGE) 2.5 MG tablet; Take 2 tablets by mouth 1 (One) Time As Needed for Migraine. 2.5 mg at onset of headache, may repeat in 4 hours if needed        Coding      Dictated using Dragon

## 2020-09-03 RX ORDER — ERENUMAB-AOOE 140 MG/ML
INJECTION, SOLUTION SUBCUTANEOUS
Qty: 1 PEN | Refills: 5 | Status: SHIPPED | OUTPATIENT
Start: 2020-09-03 | End: 2021-03-04 | Stop reason: SDUPTHER

## 2020-09-29 RX ORDER — NORTRIPTYLINE HYDROCHLORIDE 25 MG/1
25 CAPSULE ORAL NIGHTLY
Qty: 30 CAPSULE | Refills: 4 | Status: SHIPPED | OUTPATIENT
Start: 2020-09-29 | End: 2021-02-23 | Stop reason: SDUPTHER

## 2020-11-09 RX ORDER — LAMOTRIGINE 100 MG/1
100 TABLET ORAL DAILY
Qty: 30 TABLET | Refills: 2 | Status: SHIPPED | OUTPATIENT
Start: 2020-11-09 | End: 2021-02-01 | Stop reason: SDUPTHER

## 2020-11-09 NOTE — TELEPHONE ENCOUNTER
Pt last seen 8/27/2020. Follow up scheduled 3/4/2021.       Quantity 30 for 30 days.      Please review and approve or advise.     Thank you.

## 2021-02-01 RX ORDER — LAMOTRIGINE 100 MG/1
100 TABLET ORAL DAILY
Qty: 30 TABLET | Refills: 2 | Status: SHIPPED | OUTPATIENT
Start: 2021-02-01 | End: 2021-05-03 | Stop reason: SDUPTHER

## 2021-02-23 RX ORDER — NORTRIPTYLINE HYDROCHLORIDE 25 MG/1
25 CAPSULE ORAL NIGHTLY
Qty: 30 CAPSULE | Refills: 1 | Status: SHIPPED | OUTPATIENT
Start: 2021-02-23 | End: 2021-03-04

## 2021-03-04 ENCOUNTER — OFFICE VISIT (OUTPATIENT)
Dept: NEUROLOGY | Facility: CLINIC | Age: 51
End: 2021-03-04

## 2021-03-04 VITALS
SYSTOLIC BLOOD PRESSURE: 126 MMHG | BODY MASS INDEX: 43.02 KG/M2 | DIASTOLIC BLOOD PRESSURE: 90 MMHG | HEIGHT: 64 IN | WEIGHT: 252 LBS | HEART RATE: 86 BPM | OXYGEN SATURATION: 98 %

## 2021-03-04 DIAGNOSIS — E66.01 MORBIDLY OBESE (HCC): ICD-10-CM

## 2021-03-04 DIAGNOSIS — M26.622 ARTHRALGIA OF LEFT TEMPOROMANDIBULAR JOINT: ICD-10-CM

## 2021-03-04 DIAGNOSIS — G43.709 CHRONIC MIGRAINE WITHOUT AURA WITHOUT STATUS MIGRAINOSUS, NOT INTRACTABLE: Primary | ICD-10-CM

## 2021-03-04 PROCEDURE — 99214 OFFICE O/P EST MOD 30 MIN: CPT | Performed by: NURSE PRACTITIONER

## 2021-03-04 RX ORDER — NORTRIPTYLINE HYDROCHLORIDE 50 MG/1
50 CAPSULE ORAL NIGHTLY
Qty: 30 CAPSULE | Refills: 5 | Status: SHIPPED | OUTPATIENT
Start: 2021-03-04 | End: 2021-08-13 | Stop reason: SDUPTHER

## 2021-03-04 RX ORDER — ERENUMAB-AOOE 140 MG/ML
INJECTION, SOLUTION SUBCUTANEOUS
Qty: 1 PEN | Refills: 5 | Status: SHIPPED | OUTPATIENT
Start: 2021-03-04 | End: 2021-03-08

## 2021-03-04 NOTE — PROGRESS NOTES
DOS: 3/4/2021  NAME: Rhonda Dong   : 1970  PCP: Mellisa Larry APRN    Chief Complaint   Patient presents with   • Migraine      SUBJECTIVE  Neurological Problem:  50 y.o. RHW female with migraines, TMJ, DM, HTN, HLD, asthma, chronic right knee pain and h/o uveitis, b/l cataracts and CTS (s/p surgery on the right) who presents today for f/u of migraines and TMJ (on left). She is unaccompanied.     Interval History:   **For full history please see progress note dated 19.    Ms. Dong presents for f/u of migraines and TMJ. She has tried multiple medications in the past for migraine therapy but has done well on Aimovig 140 mg for prevention and naratriptan for abortive therapy.  For TMJ, she wears a bite block, has failed Tegretol and topiramate/Trokendi and was started on Lamictal, titrated up to 100 mg daily, no adverse effects, no rash.  She was last seen in 2020, doing well on the above regimen except for increased frequency of migraines and she was started on nortriptyline.     She presents today, is having increased frequency of headaches for the last 2 weeks of the month prior to her next Aimovig injection.  She has already failed Emgality in the past.  Botox was also discussed previously by Dr. Iniguez, however patient has concerns d/t her multiple allergies.  She continues to have difficulty with sleep, has tried meditation, has good sleep hygiene habits, ports having had a sleep study and is not negative for KRUPA.  Her TMJ is doing well on Lamictal 100 mg daily. She did have bunion surgery on the left foot in December.  She denies any other changes in her health since her last visit.      Current preventive therapy: Aimovig 140 mg/ml, lamictal (TMJ), nortriptyline 25 mg  Current abortive treatment:  Amerge, Flexeril  Failed medications: Trokendi XR, Emgality, neurontin, sumatriptan    Review of Systems:Review of Systems   Constitutional: Negative for activity change, appetite  change and fatigue.   HENT: Negative for ear pain, tinnitus and trouble swallowing.    Eyes: Negative for photophobia, pain and visual disturbance.   Cardiovascular: Negative for chest pain, palpitations and leg swelling.   Musculoskeletal: Negative for back pain, gait problem and neck pain.   Neurological: Positive for headaches. Negative for dizziness, tremors, seizures, syncope, facial asymmetry, speech difficulty, weakness, light-headedness and numbness.   Psychiatric/Behavioral: Positive for sleep disturbance. Negative for agitation, behavioral problems, confusion, decreased concentration, dysphoric mood, hallucinations, self-injury and suicidal ideas. The patient is not nervous/anxious and is not hyperactive.     Above ROS reviewed    The following portions of the patient's history were reviewed and updated as appropriate: allergies, current medications, past family history, past medical history, past social history, past surgical history and problem list.    Current Medications:   Current Outpatient Medications:   •  acetaminophen (TYLENOL) 500 MG tablet, Take 500 mg by mouth Every 6 (Six) Hours As Needed for Mild Pain ., Disp: , Rfl:   •  albuterol sulfate HFA (PROVENTIL HFA) 108 (90 Base) MCG/ACT inhaler, Inhale 2 puffs Every 4 (Four) Hours As Needed for Wheezing., Disp: , Rfl:   •  cyclobenzaprine (FLEXERIL) 10 MG tablet, TK 1 T PO TID PRN, Disp: , Rfl: 1  •  Erenumab-aooe (Aimovig) 140 MG/ML prefilled syringe, Sub Q injection monthly, Disp: 1 pen, Rfl: 5  •  indomethacin (INDOCIN) 50 MG capsule, TK 2 CS PO BID, Disp: , Rfl: 1  •  lamoTRIgine (LaMICtal) 100 MG tablet, Take 1 tablet by mouth Daily., Disp: 30 tablet, Rfl: 2  •  lisinopril-hydrochlorothiazide (PRINZIDE,ZESTORETIC) 20-25 MG per tablet, Take 1 tablet by mouth Daily., Disp: , Rfl:   •  metFORMIN (GLUCOPHAGE) 500 MG tablet, Take 500 mg by mouth 2 (Two) Times a Day., Disp: , Rfl: 10  •  montelukast (SINGULAIR) 10 MG tablet, Take 10 mg by mouth  Daily., Disp: , Rfl: 10  •  naratriptan (AMERGE) 2.5 MG tablet, Take 2 tablets by mouth 1 (One) Time As Needed for Migraine. 2.5 mg at onset of headache, may repeat in 4 hours if needed, Disp: 9 tablet, Rfl: 5  •  omeprazole (priLOSEC) 20 MG capsule, Take 20 mg by mouth 2 (two) times a day., Disp: , Rfl:   •  Oxymetazoline HCl (NASAL SPRAY NA), into the nostril(s) as directed by provider., Disp: , Rfl:   •  potassium chloride (K-DUR) 10 MEQ CR tablet, Take 10 mEq by mouth Daily., Disp: , Rfl: 10  •  rosuvastatin (CRESTOR) 40 MG tablet, Take  by mouth Daily., Disp: , Rfl: 2  •  SPIRIVA HANDIHALER 18 MCG per inhalation capsule, INL THE CONTENTS OF 1 C VIA INHALATION DEVICE D, Disp: , Rfl: 10  •  nortriptyline (Pamelor) 50 MG capsule, Take 1 capsule by mouth Every Night., Disp: 30 capsule, Rfl: 5  **I did not stop or change the above medications.  Patient's medication list was updated to reflect medications they have reported as currently taking, including medication changes made by other providers.    OBJECTIVE  Vitals:    03/04/21 0722   BP: 126/90   Pulse: 86   SpO2: 98%     Body mass index is 43.23 kg/m².    Diagnostics:    Laboratory Results:           Physical Exam:  GENERAL: NAD, obese  HEENT: Normocephalic, atraumatic   COR: RRR  Resp: Even and unlabored  Extremities: No signs of distal embolization.   Skin: No rashes, lesions or ulcers.  Psychiatric: Normal mood and affect.    Neurological:   MS: AO. Language normal. No neglect. Follows all commands.  CN: II-XII grossly normal  Motor: Normal strength and tone throughout.  Sensory: Intact to light touch in arms and legs  Station and Gait: Normal gait and station.    Coordination: Normal finger to nose bilaterally    Impression:  Ms. Dong presents for f/u of migraines and TMJ, with complaints of increased frequency of headaches in the last 2 weeks of the month.  She has chronic issues with sleep, will increase her nortriptyline to 50 mg nightly.  She may  also respond well to acupuncture/dry needling.  Patient can consider this in the future if nortriptyline not effective.  She will follow-up here in 6 months, sooner symptoms warrant.    Plan:   1.  Chronic migraine w/o aura  Increase nortriptyline to 50 mg qhs  Aimovig 140 mg monthly for prevention  Naratriptan 5 mg prn for abortive therapy  Avoid migraine triggers  Keep well hydrated     2. TMJ pain  Muscle relaxants per PCP  Continue lamictal 100 mg daily      F/U in 6 months    I spent a total of 25 minutes today in reviewing records, prior diagnostics, examination of patient as well as counseling and educating patient regarding diagnoses, symptoms, reviewing diagnostics, pharmacologic treatment options including risks/benefits/possible SEs, recommendations, lifestyle modifications, coordination of care and documenting plan of care.            Diagnoses and all orders for this visit:    1. Chronic migraine without aura without status migrainosus, not intractable (Primary)    2. Arthralgia of left temporomandibular joint    3. Morbidly obese (CMS/HCC)    Other orders  -     Erenumab-aooe (Aimovig) 140 MG/ML prefilled syringe; Sub Q injection monthly  Dispense: 1 pen; Refill: 5  -     nortriptyline (Pamelor) 50 MG capsule; Take 1 capsule by mouth Every Night.  Dispense: 30 capsule; Refill: 5        Coding      Dictated using Dragon

## 2021-03-07 DIAGNOSIS — G43.709 CHRONIC MIGRAINE WITHOUT AURA WITHOUT STATUS MIGRAINOSUS, NOT INTRACTABLE: Primary | ICD-10-CM

## 2021-03-08 RX ORDER — ERENUMAB-AOOE 140 MG/ML
INJECTION, SOLUTION SUBCUTANEOUS
Qty: 1 PEN | Refills: 5 | Status: SHIPPED | OUTPATIENT
Start: 2021-03-08 | End: 2021-08-13 | Stop reason: SDUPTHER

## 2021-03-24 ENCOUNTER — BULK ORDERING (OUTPATIENT)
Dept: CASE MANAGEMENT | Facility: OTHER | Age: 51
End: 2021-03-24

## 2021-03-24 DIAGNOSIS — Z23 IMMUNIZATION DUE: ICD-10-CM

## 2021-05-03 RX ORDER — LAMOTRIGINE 100 MG/1
100 TABLET ORAL DAILY
Qty: 30 TABLET | Refills: 5 | Status: SHIPPED | OUTPATIENT
Start: 2021-05-03 | End: 2021-11-03

## 2021-05-03 NOTE — TELEPHONE ENCOUNTER
Pt last seen 3/4/2021. Follow up scheduled 9/9/21.       Quantity 30 for 30 days.      Please review and approve or advise.     Thank you.

## 2021-08-13 DIAGNOSIS — G43.709 CHRONIC MIGRAINE WITHOUT AURA WITHOUT STATUS MIGRAINOSUS, NOT INTRACTABLE: ICD-10-CM

## 2021-08-13 RX ORDER — ERENUMAB-AOOE 140 MG/ML
INJECTION, SOLUTION SUBCUTANEOUS
Qty: 1 PEN | Refills: 5 | Status: SHIPPED | OUTPATIENT
Start: 2021-08-13 | End: 2021-12-09

## 2021-08-13 RX ORDER — NORTRIPTYLINE HYDROCHLORIDE 50 MG/1
50 CAPSULE ORAL NIGHTLY
Qty: 30 CAPSULE | Refills: 5 | Status: SHIPPED | OUTPATIENT
Start: 2021-08-13 | End: 2022-02-21 | Stop reason: SDUPTHER

## 2021-08-13 NOTE — TELEPHONE ENCOUNTER
Please advise. Pt last seen by anabella on 3/4/2021. Apt with anabella on 9/9/2021. Approved per DR. Iniguez

## 2021-08-13 NOTE — TELEPHONE ENCOUNTER
Pt last seen 3/4/2021. Follow up scheduled 9/9/2021.       Quantity 30 for 30 days.      Please review and approve or advise.     Thank you.

## 2021-09-01 RX ORDER — NORTRIPTYLINE HYDROCHLORIDE 50 MG/1
50 CAPSULE ORAL NIGHTLY
Qty: 30 CAPSULE | Refills: 5 | OUTPATIENT
Start: 2021-09-01

## 2021-09-09 ENCOUNTER — OFFICE VISIT (OUTPATIENT)
Dept: NEUROLOGY | Facility: CLINIC | Age: 51
End: 2021-09-09

## 2021-09-09 VITALS
DIASTOLIC BLOOD PRESSURE: 78 MMHG | HEART RATE: 97 BPM | HEIGHT: 64 IN | BODY MASS INDEX: 45.58 KG/M2 | SYSTOLIC BLOOD PRESSURE: 122 MMHG | OXYGEN SATURATION: 100 % | WEIGHT: 267 LBS

## 2021-09-09 DIAGNOSIS — M26.622 ARTHRALGIA OF LEFT TEMPOROMANDIBULAR JOINT: ICD-10-CM

## 2021-09-09 DIAGNOSIS — G43.709 CHRONIC MIGRAINE WITHOUT AURA WITHOUT STATUS MIGRAINOSUS, NOT INTRACTABLE: Primary | ICD-10-CM

## 2021-09-09 PROCEDURE — 99213 OFFICE O/P EST LOW 20 MIN: CPT | Performed by: NURSE PRACTITIONER

## 2021-09-09 RX ORDER — AMOXICILLIN 500 MG/1
CAPSULE ORAL
COMMUNITY
Start: 2021-09-08

## 2021-09-09 NOTE — PROGRESS NOTES
DOS: 2021  NAME: Rhonda Dong   : 1970  PCP: Mellisa Larry APRN    Chief Complaint   Patient presents with   • Migraine      SUBJECTIVE  Neurological Problem:  51 y.o. RHW female with migraines, TMJ, DM, HTN, HLD, asthma, chronic right knee pain and h/o uveitis, b/l cataracts and CTS (s/p surgery on the right) who presents today for f/u of migraines and TMJ (on left). She is unaccompanied.      Interval History:   **For previous detailed history please see progress note dated 19.    Ms. Dong presents for f/u of migraines and TMJ. She has tried multiple medications in the past for migraine therapy but has done well on Aimovig 140 mg for prevention and naratriptan for abortive therapy.  For TMJ, she wears a bite block, has failed Tegretol and topiramate/Trokendi and was started on Lamictal, titrated up to 100 mg daily, no adverse effects, no rash.  She was last seen in the office in 2021, doing well on the above regimen except for experiencing breakthrough headaches as he gets closer to her next injection.    She presents today, apparently has had an insurance change and has not had her Aimovig, this is caused an increase in frequency of headaches.  She otherwise has done well with 140 mg/milliliter as well as her Lamictal for TMJ, and nortriptyline 50 mg nightly.  She apparently has had some kidney function issues, is now seeing a nephrologist.  No labs available currently.  She is looking into massage therapy.  She continues to wear her bite block for her TMJ.  She denies any other changes in her health since her last visit.  She states her BP is well controlled.      Review of Systems:Review of Systems   Constitutional: Positive for fatigue. Negative for activity change and appetite change.   HENT: Negative for ear pain, tinnitus and trouble swallowing.    Eyes: Negative for photophobia, pain and visual disturbance.   Musculoskeletal: Positive for neck pain and neck stiffness.  Negative for gait problem.   Neurological: Positive for headaches. Negative for dizziness, tremors, seizures, syncope, facial asymmetry, speech difficulty, weakness, light-headedness and numbness.   Psychiatric/Behavioral: Negative for agitation, behavioral problems, confusion, decreased concentration, dysphoric mood, hallucinations, self-injury, sleep disturbance and suicidal ideas. The patient is not nervous/anxious and is not hyperactive.     Above ROS reviewed    The following portions of the patient's history were reviewed and updated as appropriate: allergies, current medications, past family history, past medical history, past social history, past surgical history and problem list.    Current Medications:   Current Outpatient Medications:   •  albuterol sulfate HFA (PROVENTIL HFA) 108 (90 Base) MCG/ACT inhaler, Inhale 2 puffs Every 4 (Four) Hours As Needed for Wheezing., Disp: , Rfl:   •  amoxicillin (AMOXIL) 500 MG capsule, , Disp: , Rfl:   •  cyclobenzaprine (FLEXERIL) 10 MG tablet, TK 1 T PO TID PRN, Disp: , Rfl: 1  •  Erenumab-aooe (Aimovig) 140 MG/ML prefilled syringe, ADMINISTER 1 ML UNDER THE SKIN EVERY 30 DAYS, Disp: 1 pen, Rfl: 5  •  indomethacin (INDOCIN) 50 MG capsule, TK 2 CS PO BID, Disp: , Rfl: 1  •  lamoTRIgine (LaMICtal) 100 MG tablet, Take 1 tablet by mouth Daily., Disp: 30 tablet, Rfl: 5  •  lisinopril-hydrochlorothiazide (PRINZIDE,ZESTORETIC) 20-25 MG per tablet, Take 1 tablet by mouth Daily., Disp: , Rfl:   •  metFORMIN (GLUCOPHAGE) 500 MG tablet, Take 500 mg by mouth 2 (Two) Times a Day., Disp: , Rfl: 10  •  montelukast (SINGULAIR) 10 MG tablet, Take 10 mg by mouth Daily., Disp: , Rfl: 10  •  nortriptyline (Pamelor) 50 MG capsule, Take 1 capsule by mouth Every Night., Disp: 30 capsule, Rfl: 5  •  omeprazole (priLOSEC) 20 MG capsule, Take 20 mg by mouth 2 (two) times a day., Disp: , Rfl:   •  Oxymetazoline HCl (NASAL SPRAY NA), into the nostril(s) as directed by provider., Disp: , Rfl:   •   potassium chloride (K-DUR) 10 MEQ CR tablet, Take 10 mEq by mouth Daily., Disp: , Rfl: 10  •  rosuvastatin (CRESTOR) 40 MG tablet, Take  by mouth Daily., Disp: , Rfl: 2  •  SPIRIVA HANDIHALER 18 MCG per inhalation capsule, INL THE CONTENTS OF 1 C VIA INHALATION DEVICE D, Disp: , Rfl: 10  •  VITAMIN D PO, Take  by mouth., Disp: , Rfl:   •  acetaminophen (TYLENOL) 500 MG tablet, Take 500 mg by mouth Every 6 (Six) Hours As Needed for Mild Pain ., Disp: , Rfl:   •  naratriptan (AMERGE) 2.5 MG tablet, Take 2 tablets by mouth 1 (One) Time As Needed for Migraine. 2.5 mg at onset of headache, may repeat in 4 hours if needed, Disp: 9 tablet, Rfl: 5  **I did not stop or change the above medications.  Patient's medication list was updated to reflect medications they have reported as currently taking, including medication changes made by other providers.    OBJECTIVE  Vitals:    09/09/21 0726   BP: 122/78   Pulse: 97   SpO2: 100%     Body mass index is 45.81 kg/m².    Diagnostics:    Laboratory Results:           Physical Exam:  GENERAL: NAD, overweight  HEENT: Normocephalic, atraumatic   COR: RRR  Resp: Even and unlabored  Extremities: No signs of distal embolization.   Skin: No rashes, lesions or ulcers.  Psychiatric: Normal mood and affect.    Neurological:   MS: AO. Language normal. No neglect. Follows all commands.  CN: II-XII grossly normal  Motor: Normal strength and tone throughout.  Sensory: Intact to light touch in arms and legs  Station and Gait: Normal gait and station.    Coordination: Normal finger to nose bilaterally    Impression:  Ms. Dong presents for f/u of migraines and TMJ, doing fairly well, having some issues with insurance and getting aimovig, will give samples today. She also reports some kidney issues, will request labwork from her PCP today. She is looking into possible massage therapy, we discussed that she may benefit from accupuncture/dry needling. She will f/u here in one year, sooner if  symptoms warrant.     Plan:     1. Chronic migraine w/o aura  Continue nortriptyline 50 mg nightly  Aimovig 140 mg monthly for prevention -samples given today d/t issues with insurance  Naratriptan 5 mg as needed for abortive therapy  Avoid migraine triggers  Keep well-hydrated    2.  TMJ pain  Muscle relaxants per PCP  Continue Lamictal 100 mg daily  Obtain lab work from PCP    Follow-up in 1 year, sooner symptoms warrant.    There are no diagnoses linked to this encounter.    Coding      Dictated using Dragon

## 2021-10-07 ENCOUNTER — TELEPHONE (OUTPATIENT)
Dept: NEUROLOGY | Facility: CLINIC | Age: 51
End: 2021-10-07

## 2021-10-07 NOTE — TELEPHONE ENCOUNTER
Provider: DELMY CASTRO   Caller: ALEX   Relationship to Patient: [facility]   Phone Number: [phone]  Reason for Call: AIMOVIG PA RECENTLY DENIED- KEY NUMBER- [id number] BUT INSURANCE IS ALLOWING A RESUBMISSION OF THE PA AND THE NEW KEY NUMBER- [id number]

## 2021-11-03 RX ORDER — LAMOTRIGINE 100 MG/1
100 TABLET ORAL DAILY
Qty: 30 TABLET | Refills: 10 | Status: SHIPPED | OUTPATIENT
Start: 2021-11-03 | End: 2022-07-20 | Stop reason: SDUPTHER

## 2021-11-03 NOTE — TELEPHONE ENCOUNTER
Pt last seen. 9/9/2021    Follow up scheduled.  9/9/2022     Quantity 30  for 30 days.      Please review and approve or advise.     Thank you.

## 2021-12-02 NOTE — TELEPHONE ENCOUNTER
Pt last seen. 9/9/2021    Follow up scheduled.  9/9/2022     Quantity 9  for 30 days.      Please review and approve or advise.     Thank you.

## 2021-12-03 RX ORDER — NARATRIPTAN 2.5 MG/1
2.5 TABLET ORAL ONCE AS NEEDED
Qty: 9 TABLET | Refills: 5 | Status: SHIPPED | OUTPATIENT
Start: 2021-12-03 | End: 2023-02-06 | Stop reason: SDUPTHER

## 2021-12-09 DIAGNOSIS — G43.709 CHRONIC MIGRAINE WITHOUT AURA WITHOUT STATUS MIGRAINOSUS, NOT INTRACTABLE: ICD-10-CM

## 2021-12-09 RX ORDER — ERENUMAB-AOOE 140 MG/ML
INJECTION, SOLUTION SUBCUTANEOUS
Qty: 1 ML | Refills: 5 | Status: SHIPPED | OUTPATIENT
Start: 2021-12-09 | End: 2022-08-19

## 2022-02-21 RX ORDER — NORTRIPTYLINE HYDROCHLORIDE 50 MG/1
50 CAPSULE ORAL NIGHTLY
Qty: 30 CAPSULE | Refills: 5 | Status: SHIPPED | OUTPATIENT
Start: 2022-02-21 | End: 2022-07-20 | Stop reason: SDUPTHER

## 2022-03-22 ENCOUNTER — CLINICAL SUPPORT (OUTPATIENT)
Dept: OTHER | Facility: HOSPITAL | Age: 52
End: 2022-03-22

## 2022-03-22 ENCOUNTER — APPOINTMENT (OUTPATIENT)
Dept: LAB | Facility: HOSPITAL | Age: 52
End: 2022-03-22

## 2022-03-22 DIAGNOSIS — Z80.3 FAMILY HISTORY OF BREAST CANCER: ICD-10-CM

## 2022-03-22 DIAGNOSIS — Z13.79 GENETIC TESTING: Primary | ICD-10-CM

## 2022-03-22 PROCEDURE — 96040: CPT | Performed by: GENETIC COUNSELOR, MS

## 2022-03-22 NOTE — PROGRESS NOTES
Rhonda Dong is a 51-year-old female who was seen for genetic counseling due to a family history of breast cancer. She does not have a personal history of cancer. Ms. Dong was 16 years old at menarche and had her first child at age 22. She went through menopause at age 44 and retains her uterus and ovaries. Her most recent mammogram was in early 2022 and this was normal. She previously had a breast reduction in 2018. Ms. Dong’s most recent colonoscopy was in 2020 and this was normal. Ms. Dong was interested in discussing her risk for a hereditary cancer syndrome. Ms. Dong chose to pursue genetic testing via the Common Hereditary Cancers panel through [facility] This panel evaluates 47 genes associated with hereditary cancer. Results are expected 2-3 weeks.     FAMILY HISTORY (see attached pedigree):    Mother:    Breast cancer, 30s      We do not have medical records regarding the diagnoses in Ms. Dong’s family.    RISK ASSESSMENT: Ms. Dong’s family history of breast cancer raises concern for a hereditary cancer syndrome. NCCN criteria for genetic testing for BRCA1/2 states that an individual with a close relative diagnosed with breast cancer under the age of 45 may consider testing. Based on Ms. Dong’s mother being diagnosed with breast cancer in her 30s, she would clearly meet this criteria. Ms. Dong was interested in pursuing genetic testing and decided to pursue testing via the Common Hereditary Cancers panel through [facility] These risk assessments are based on the family history information provided at the time of the appointment and could change in the future should new information be obtained.      GENETIC COUNSELING (30 minutes):  We reviewed the family history information in detail. Cases of cancer follow three general patterns: sporadic, familial, and hereditary.  While most cancer is sporadic, some cases appear to occur in family clusters.  These cases  are said to be familial and account for 10-20% of cancer cases.  Familial cases may be due to a combination of shared genes and environmental factors among family members.  In even fewer families, the cancer is said to be inherited, and the genes responsible for the cancer are known.      Family histories typical of hereditary cancer syndromes usually include multiple first- and second-degree relatives diagnosed with cancer types that define a syndrome. These cases tend to be diagnosed at younger-than-expected ages and can be bilateral or multifocal. The cancer in these families follows an autosomal dominant inheritance pattern, which indicates the likely presence of a mutation in a cancer susceptibility gene. Children and siblings of an individual believed to carry this mutation have a 50% chance of inheriting that mutation, thereby inheriting the increased risk to develop cancer. These mutations can be passed down from the maternal or the paternal lineage.    We discussed that hereditary breast cancer accounts for 5-10% of all cases.  A significant proportion of hereditary breast cancer can be attributed to mutations in the BRCA1 and BRCA2 genes.  Mutations in these genes confer an increased risk for breast cancer, ovarian cancer, male breast cancer, prostate cancer, and pancreatic cancer.  Women with a BRCA1 or BRCA2 mutation have up to an 87% lifetime risk of breast cancer and up to a 44% risk of ovarian cancer.    There are many other genes associated with breast cancer and other cancers. We discussed the availability of a multi-gene panel and Ms. Dong opted to pursue testing via the Common Hereditary Cancers panel. Some of the genes that will be evaluated on this multigene panel have well defined cancer risks and established management guidelines.  Other genes that can be tested for have been more recently described, and there may be less data regarding the risks and therefore may not have established  management guidelines.  We discussed these limitations at length.     GENETIC TESTING:  The risks, benefits and limitations of genetic testing and implications for clinical management following testing were reviewed. DNA test results can influence decisions regarding screening and prevention.  Genetic testing can have significant psychological implications for both individuals and families. Also discussed was the possibility of employment and insurance discrimination based on genetic test results and the federal and states laws that are in place to prevent this (YANG), as well as the limitations of these laws.         We discussed panel testing, which would involve testing 47 genes associated with increased cancer risk. The benefits and limitations of genetic testing were discussed. The implications of a positive or negative test result were discussed. We also discussed the importance of testing on an affected relative and how it is possible that the cancers in her family could be due to a genetic mutation that she did not inherit.     We discussed the possibility that, in some cases, genetic test results may be ambiguous due to the identification of a genetic variant. These variants may or may not be associated with an increased cancer risk. With multigene panel testing, it is not uncommon for a variant of uncertain significance (VUS) to be identified.  If a VUS is identified, testing family members is typically not recommended and screening recommendations are made based on the family history.  The laboratories that perform genetic testing work to reclassify the VUS and send out an amended report if and when a VUS is reclassified.  The majority of variant findings are ultimately reclassified to a negative result. Given her family history, a negative test result does not eliminate all cancer risk, although the risk would not be as high as it would with positive genetic testing.        PLAN:  Genetic testing was  ordered through [facility] and results are expected in 2-3 weeks. If she has any questions in the meantime, she is welcome to give our genetics team a call at [phone].    Dang Toro MS, [facility] [place], [facility]  Licensed Certified Genetic Counselor

## 2022-04-04 ENCOUNTER — DOCUMENTATION (OUTPATIENT)
Dept: OTHER | Facility: HOSPITAL | Age: 52
End: 2022-04-04

## 2022-04-04 NOTE — PROGRESS NOTES
Rhonda Dong is a 51-year-old female who was seen for genetic counseling due to a family history of breast cancer. She does not have a personal history of cancer. Ms. Dong was 16 years old at menarche and had her first child at age 22. She went through menopause at age 44 and retains her uterus and ovaries. Her most recent mammogram was in early 2022 and this was normal. She previously had a breast reduction in 2018. Ms. Dong’s most recent colonoscopy was in 2020 and this was normal. Ms. Dong was interested in discussing her risk for a hereditary cancer syndrome. Ms. Dong chose to pursue genetic testing via the Common Hereditary Cancers panel through [facility]. This panel evaluates 47 genes associated with hereditary cancer. Genetic testing was negative for any known pathogenic/deleterious mutations in the genes on this panel. While no known deleterious mutations were identified, a variant of uncertain significance (VUS) was identified in the AMARA gene. VUSs are changes in DNA that may or may not affect the function of the gene.   VUSs are frequently identified through multigene panel testing, given the number of genes being evaluated and the presence of genetic variation in the population. The majority (estimated to be >90%) of VUS’s are eventually reclassified as benign gene changes. It is not recommended that any unaffected relatives be tested for a VUS at this time, and management should not be altered based on a VUS.  These results were discussed with Ms. Dong by telephone on 04/04/2022.    FAMILY HISTORY (see attached pedigree):    Mother:    Breast cancer, 30s      We do not have medical records regarding the diagnoses in Ms. Dong’s family.    RISK ASSESSMENT: Ms. Dong’s family history of breast cancer raises concern for a hereditary cancer syndrome. NCCN criteria for genetic testing for BRCA1/2 states that an individual with a close relative diagnosed with breast cancer  under the age of 45 may consider testing. Based on Ms. Dong’s mother being diagnosed with breast cancer in her 30s, she would clearly meet this criteria. Ms. Dong was interested in pursuing genetic testing and decided to pursue testing via the Common Hereditary Cancers panel through [facility]. These risk assessments are based on the family history information provided at the time of the appointment and could change in the future should new information be obtained.    At this time, Ms. Dong’s lifetime risk of developing breast cancer should be assessed using family history risk assessment models. Using the Tyrer-Daniellezick, v8 risk model Ms. Dong’s lifetime risk for breast cancer was estimated to be 10%, slightly above the average 52-year-old woman’s risk of 9%.  This risk should be recalculated if there are changes to the patient’s family history.  Per NCCN guidelines, a lifetime risk greater than 20% is considered high risk and warrants increased screening; Ms. Dong does not fall into this category. These risk assessments are based on the family history information provided at the time of the appointment.  The assessments could change in the future should new information be obtained.      GENETIC COUNSELING:  We reviewed the family history information in detail. Cases of cancer follow three general patterns: sporadic, familial, and hereditary.  While most cancer is sporadic, some cases appear to occur in family clusters.  These cases are said to be familial and account for 10-20% of cancer cases.  Familial cases may be due to a combination of shared genes and environmental factors among family members.  In even fewer families, the cancer is said to be inherited, and the genes responsible for the cancer are known.      Family histories typical of hereditary cancer syndromes usually include multiple first- and second-degree relatives diagnosed with cancer types that define a syndrome. These  cases tend to be diagnosed at younger-than-expected ages and can be bilateral or multifocal. The cancer in these families follows an autosomal dominant inheritance pattern, which indicates the likely presence of a mutation in a cancer susceptibility gene. Children and siblings of an individual believed to carry this mutation have a 50% chance of inheriting that mutation, thereby inheriting the increased risk to develop cancer. These mutations can be passed down from the maternal or the paternal lineage.    We discussed that hereditary breast cancer accounts for 5-10% of all cases.  A significant proportion of hereditary breast cancer can be attributed to mutations in the BRCA1 and BRCA2 genes.  Mutations in these genes confer an increased risk for breast cancer, ovarian cancer, male breast cancer, prostate cancer, and pancreatic cancer.  Women with a BRCA1 or BRCA2 mutation have up to an 87% lifetime risk of breast cancer and up to a 44% risk of ovarian cancer.    There are many other genes associated with breast cancer and other cancers. We discussed the availability of a multi-gene panel and Ms. Dong opted to pursue testing via the Common Hereditary Cancers panel. Some of the genes that will be evaluated on this multigene panel have well defined cancer risks and established management guidelines.  Other genes that can be tested for have been more recently described, and there may be less data regarding the risks and therefore may not have established management guidelines.  We discussed these limitations at length.     GENETIC TESTING:  The risks, benefits and limitations of genetic testing and implications for clinical management following testing were reviewed. DNA test results can influence decisions regarding screening and prevention.  Genetic testing can have significant psychological implications for both individuals and families. Also discussed was the possibility of employment and insurance  discrimination based on genetic test results and the federal and states laws that are in place to prevent this (YANG), as well as the limitations of these laws.         We discussed panel testing, which would involve testing 47 genes associated with increased cancer risk. The benefits and limitations of genetic testing were discussed. The implications of a positive or negative test result were discussed. We also discussed the importance of testing on an affected relative and how it is possible that the cancers in her family could be due to a genetic mutation that she did not inherit.     We discussed the possibility that, in some cases, genetic test results may be ambiguous due to the identification of a genetic variant. These variants may or may not be associated with an increased cancer risk. With multigene panel testing, it is not uncommon for a variant of uncertain significance (VUS) to be identified.  If a VUS is identified, testing family members is typically not recommended and screening recommendations are made based on the family history.  The laboratories that perform genetic testing work to reclassify the VUS and send out an amended report if and when a VUS is reclassified.  The majority of variant findings are ultimately reclassified to a negative result. Given her family history, a negative test result does not eliminate all cancer risk, although the risk would not be as high as it would with positive genetic testing.    TEST RESULTS: Genetic testing was negative for known deleterious mutations by sequencing and rearrangement testing of the 47 genes on the Common Hereditary Cancers panel.  This negative result greatly lowers the risk of a hereditary cancer syndrome for Ms. Dong. Other family members could still consider genetic testing due to the possibility of a genetic mutation in the family that Ms. Dong did not inherit.     A VUS was identified in the AMARA gene. A VUS is a difference within a  gene that may or may not impact the function of the gene.  VUSs are not clinically actionable findings, and therefore this result does not impact management in any way.  The majority of VUSs are ultimately reclassified to benign variants.  The identification of a VUS is common in multigene panel testing, given the number of genes being evaluated and the presence of genetic variation in the population.  If this variant is ever reclassified, a new report will be issued by the laboratory and released directly to the ordering physician, and our office.  This assessment is based on the information provided at the time of the consultation.      SURVEILLANCE: Options available to individuals with an elevated lifetime risk for breast cancer were discussed. Based on computer modeling, Ms. Dong’s lifetime risk for breast cancer would not be considered “high risk”. General population screening guidelines include self-breast exams, annual clinical breast exam, and annual mammography starting at age 40. This assessment is based on the information provided at the time of the consultation.        PLAN:   Genetic counseling remains available to Ms. Dong and her family. If she has any questions or concerns in the future, she is welcome to give our genetics team a call at [phone].    Dang Toro MS, [facility] [place]  Licensed Certified Genetic Counselor    Cc: CLAY Sultana

## 2022-07-20 RX ORDER — NORTRIPTYLINE HYDROCHLORIDE 50 MG/1
50 CAPSULE ORAL NIGHTLY
Qty: 30 CAPSULE | Refills: 5 | Status: SHIPPED | OUTPATIENT
Start: 2022-07-20

## 2022-07-20 RX ORDER — LAMOTRIGINE 100 MG/1
100 TABLET ORAL DAILY
Qty: 30 TABLET | Refills: 5 | Status: SHIPPED | OUTPATIENT
Start: 2022-07-20 | End: 2023-07-20

## 2022-08-17 DIAGNOSIS — G43.709 CHRONIC MIGRAINE WITHOUT AURA WITHOUT STATUS MIGRAINOSUS, NOT INTRACTABLE: ICD-10-CM

## 2022-08-18 DIAGNOSIS — G43.709 CHRONIC MIGRAINE WITHOUT AURA WITHOUT STATUS MIGRAINOSUS, NOT INTRACTABLE: ICD-10-CM

## 2022-08-19 RX ORDER — ERENUMAB-AOOE 140 MG/ML
INJECTION, SOLUTION SUBCUTANEOUS
Qty: 1 ML | Refills: 5 | Status: SHIPPED | OUTPATIENT
Start: 2022-08-19 | End: 2023-02-06 | Stop reason: SDUPTHER

## 2022-08-19 RX ORDER — ERENUMAB-AOOE 140 MG/ML
INJECTION, SOLUTION SUBCUTANEOUS
Qty: 1 ML | Refills: 5 | Status: SHIPPED | OUTPATIENT
Start: 2022-08-19 | End: 2022-08-19

## 2022-09-09 ENCOUNTER — OFFICE VISIT (OUTPATIENT)
Dept: NEUROLOGY | Facility: CLINIC | Age: 52
End: 2022-09-09

## 2022-09-09 VITALS
DIASTOLIC BLOOD PRESSURE: 78 MMHG | HEIGHT: 64 IN | BODY MASS INDEX: 46.95 KG/M2 | HEART RATE: 80 BPM | SYSTOLIC BLOOD PRESSURE: 128 MMHG | WEIGHT: 275 LBS | OXYGEN SATURATION: 99 %

## 2022-09-09 DIAGNOSIS — G43.709 CHRONIC MIGRAINE WITHOUT AURA WITHOUT STATUS MIGRAINOSUS, NOT INTRACTABLE: Primary | ICD-10-CM

## 2022-09-09 DIAGNOSIS — M26.622 ARTHRALGIA OF LEFT TEMPOROMANDIBULAR JOINT: ICD-10-CM

## 2022-09-09 PROCEDURE — 99214 OFFICE O/P EST MOD 30 MIN: CPT | Performed by: NURSE PRACTITIONER

## 2022-09-09 NOTE — PROGRESS NOTES
DOS: 2022  NAME: Rhonda Dong   : 1970  PCP: Mellisa Larry APRN    Chief Complaint   Patient presents with   • Migraine      SUBJECTIVE  Neurological Problem:  52 y.o. RHW female with migraines, TMJ, DM, HTN, HLD, renal insufficiency,  asthma, chronic right knee pain and h/o uveitis, b/l cataracts and CTS (s/p surgery on the right) who presents today for f/u of migraines and TMJ (on left). She is unaccompanied.     Interval History:   **For previous detailed history please see progress note dated 19.    Ms. Dong has been followed for h/o migraines and TMJ. She has tried multiple medications in the past for migraine therapy but has done well on Aimovig 140 mg for prevention and naratriptan for abortive therapy.  For TMJ, she wears a bite block, has failed Tegretol and topiramate/Trokendi and was started on Lamictal, titrated up to 100 mg daily, which she has tolerated well.  She was last seen in the office in 2021, had started seeing a nephrologist for some kidney issues but otherwise doing well.    Patient presents today, continues on Aimovig for prevention monthly.  Naratriptan for rescue.  She is also on nortriptyline 50 mg nightly and Lamictal 100 mg daily for TMJ.  She was doing very well on this regimen.  From a migraine standpoint she is only had to use her breakthrough medication handful of times in the last year.  She has had some breakthrough TMJ which she uses ice, cold which is helpful.  She continues to use a bite block for prevention.  She has seen a nephrologist since her last visit, was told to limit NSAID use.  Review of most recent lab work done in 2022 shows a CMP with a creatinine of 1.22, GFR 53, glucose 112; CBC WNL; TSH 1.35; vitamin D 49.4; in 2021, her A1c was 6.6.   She denies any other changes in her health since her last visit.  She will be going to a 4-day work week which should be helpful for stress.  She states her BP is well  controlled.    Review of Systems:Review of Systems   Constitutional: Negative for activity change, appetite change, chills, diaphoresis, fatigue, fever and unexpected weight change.   HENT: Negative for congestion, dental problem, drooling, ear discharge, ear pain, facial swelling, hearing loss, mouth sores, nosebleeds, postnasal drip, rhinorrhea, sinus pressure, sinus pain, sneezing, sore throat, tinnitus, trouble swallowing and voice change.    Eyes: Negative for photophobia, pain, discharge, redness, itching and visual disturbance.   Musculoskeletal: Negative for arthralgias, back pain, gait problem, joint swelling, myalgias, neck pain and neck stiffness.   Neurological: Positive for headaches. Negative for dizziness, tremors, seizures, syncope, facial asymmetry, speech difficulty, weakness, light-headedness and numbness.   Psychiatric/Behavioral: Positive for sleep disturbance. Negative for agitation, behavioral problems, confusion, decreased concentration, dysphoric mood, hallucinations, self-injury and suicidal ideas. The patient is not nervous/anxious and is not hyperactive.     Above ROS reviewed    The following portions of the patient's history were reviewed and updated as appropriate: allergies, current medications, past family history, past medical history, past social history, past surgical history and problem list.    Current Medications:   Current Outpatient Medications:   •  acetaminophen (TYLENOL) 500 MG tablet, Take 500 mg by mouth Every 6 (Six) Hours As Needed for Mild Pain ., Disp: , Rfl:   •  albuterol sulfate  (90 Base) MCG/ACT inhaler, Inhale 2 puffs Every 4 (Four) Hours As Needed for Wheezing., Disp: , Rfl:   •  cyclobenzaprine (FLEXERIL) 10 MG tablet, TK 1 T PO TID PRN, Disp: , Rfl: 1  •  Erenumab-aooe (Aimovig) 140 MG/ML prefilled syringe, INJECT 1ML UNDER THE SKIN EVERY 30 DAYS, Disp: 1 mL, Rfl: 5  •  lamoTRIgine (LaMICtal) 100 MG tablet, Take 1 tablet by mouth Daily., Disp: 30  tablet, Rfl: 5  •  lisinopril-hydrochlorothiazide (PRINZIDE,ZESTORETIC) 20-25 MG per tablet, Take 1 tablet by mouth Daily., Disp: , Rfl:   •  metFORMIN (GLUCOPHAGE) 500 MG tablet, Take 500 mg by mouth 2 (Two) Times a Day., Disp: , Rfl: 10  •  montelukast (SINGULAIR) 10 MG tablet, Take 10 mg by mouth Daily., Disp: , Rfl: 10  •  nortriptyline (Pamelor) 50 MG capsule, Take 1 capsule by mouth Every Night., Disp: 30 capsule, Rfl: 5  •  omeprazole (priLOSEC) 20 MG capsule, Take 20 mg by mouth 2 (two) times a day., Disp: , Rfl:   •  Oxymetazoline HCl (NASAL SPRAY NA), into the nostril(s) as directed by provider., Disp: , Rfl:   •  potassium chloride (K-DUR) 10 MEQ CR tablet, Take 10 mEq by mouth Daily., Disp: , Rfl: 10  •  rosuvastatin (CRESTOR) 40 MG tablet, Take  by mouth Daily., Disp: , Rfl: 2  •  SPIRIVA HANDIHALER 18 MCG per inhalation capsule, INL THE CONTENTS OF 1 C VIA INHALATION DEVICE D, Disp: , Rfl: 10  •  VITAMIN D PO, Take  by mouth., Disp: , Rfl:   •  amoxicillin (AMOXIL) 500 MG capsule, , Disp: , Rfl:   •  indomethacin (INDOCIN) 50 MG capsule, TK 2 CS PO BID, Disp: , Rfl: 1  •  naratriptan (AMERGE) 2.5 MG tablet, Take 1 tablet by mouth 1 (One) Time As Needed for Migraine for up to 30 days. 2.5 mg at onset of headache, may repeat x one if needed, Disp: 9 tablet, Rfl: 5  **I did not stop or change the above medications.  Patient's medication list was updated to reflect medications they have reported as currently taking, including medication changes made by other providers.    OBJECTIVE  Vitals:    09/09/22 0725   BP: 128/78   Pulse: 80   SpO2: 99%     Body mass index is 47.18 kg/m².    Diagnostics:    [facility]atory Results:        [facility]    Physical Exam:  GENERAL: NAD, overweight  HEENT: Normocephalic, atraumatic   COR: RRR  Resp: Even and unlabored  Extremities: No signs of distal embolization.   Skin: No rashes, lesions or ulcers.  Psychiatric: Normal mood and  affect.    Neurological:   MS: AO. Language normal. No neglect. Recall (3/3). Follows all commands.  CN: II-XII grossly normal  Motor: Normal strength and tone throughout.  Sensory: Intact to light touch in arms and legs  Station and Gait: Normal gait and station.    Coordination: Normal finger to nose bilaterally    Impression/Plan:    1.  Chronic migraine w/o aura  Continue nortriptyline 50 mg nightly and Aimovig 140 mg monthly for prevention  Continue naratriptan 5 mg as needed for abortive therapy  Avoid migraine triggers, keep well-hydrated    2.  TMJ pain -doing well on muscle relaxants per PCP, continue Lamictal 100 mg daily, lab work reviewed today, no recent B12 noted.    Follow-up in 1 year, sooner if symptoms warrant.    I spent a total of 30 minutes today in reviewing records, prior diagnostics, examination of patient as well as counseling and educating patient regarding diagnoses, symptoms, reviewing diagnostics with patient, pharmacologic treatment options including purpose, risk, benefits, possible side-effects, recommendations, lifestyle modifications, coordination of care and documenting plan of care.          Diagnoses and all orders for this visit:    1. Chronic migraine without aura without status migrainosus, not intractable (Primary)    2. Arthralgia of left temporomandibular joint        Coding      Dictated using Dragon

## 2023-02-06 DIAGNOSIS — G43.709 CHRONIC MIGRAINE WITHOUT AURA WITHOUT STATUS MIGRAINOSUS, NOT INTRACTABLE: ICD-10-CM

## 2023-02-06 RX ORDER — ERENUMAB-AOOE 140 MG/ML
INJECTION, SOLUTION SUBCUTANEOUS
Qty: 1 ML | Refills: 5 | Status: SHIPPED | OUTPATIENT
Start: 2023-02-06

## 2023-02-06 RX ORDER — NARATRIPTAN 2.5 MG/1
2.5 TABLET ORAL ONCE AS NEEDED
Qty: 9 TABLET | Refills: 5 | Status: SHIPPED | OUTPATIENT
Start: 2023-02-06 | End: 2023-03-08

## 2023-04-10 RX ORDER — LAMOTRIGINE 100 MG/1
100 TABLET ORAL DAILY
Qty: 30 TABLET | Refills: 5 | Status: SHIPPED | OUTPATIENT
Start: 2023-04-10 | End: 2024-04-09

## 2023-04-10 RX ORDER — NORTRIPTYLINE HYDROCHLORIDE 50 MG/1
50 CAPSULE ORAL NIGHTLY
Qty: 30 CAPSULE | Refills: 5 | Status: SHIPPED | OUTPATIENT
Start: 2023-04-10

## 2023-04-24 ENCOUNTER — TELEPHONE (OUTPATIENT)
Dept: NEUROLOGY | Facility: CLINIC | Age: 53
End: 2023-04-24
Payer: COMMERCIAL

## 2023-04-28 ENCOUNTER — TELEPHONE (OUTPATIENT)
Dept: NEUROLOGY | Facility: CLINIC | Age: 53
End: 2023-04-28
Payer: COMMERCIAL

## 2023-04-28 NOTE — TELEPHONE ENCOUNTER
4/28 Spoke & scheduled w/ pt regarding move from [facility] to different provider. MyChart (if applicable) & mail reminder sent.

## 2023-08-13 DIAGNOSIS — G43.709 CHRONIC MIGRAINE WITHOUT AURA WITHOUT STATUS MIGRAINOSUS, NOT INTRACTABLE: ICD-10-CM

## 2023-08-14 RX ORDER — ERENUMAB-AOOE 140 MG/ML
INJECTION, SOLUTION SUBCUTANEOUS
Qty: 1 ML | Refills: 5 | Status: SHIPPED | OUTPATIENT
Start: 2023-08-14

## 2023-09-08 ENCOUNTER — TELEPHONE (OUTPATIENT)
Dept: NEUROLOGY | Facility: CLINIC | Age: 53
End: 2023-09-08

## 2023-09-08 ENCOUNTER — OFFICE VISIT (OUTPATIENT)
Dept: NEUROLOGY | Facility: CLINIC | Age: 53
End: 2023-09-08
Payer: COMMERCIAL

## 2023-09-08 VITALS
HEART RATE: 113 BPM | WEIGHT: 279 LBS | OXYGEN SATURATION: 98 % | SYSTOLIC BLOOD PRESSURE: 124 MMHG | HEIGHT: 64 IN | DIASTOLIC BLOOD PRESSURE: 72 MMHG | BODY MASS INDEX: 47.63 KG/M2

## 2023-09-08 DIAGNOSIS — G43.709 CHRONIC MIGRAINE WITHOUT AURA WITHOUT STATUS MIGRAINOSUS, NOT INTRACTABLE: Primary | ICD-10-CM

## 2023-09-08 PROCEDURE — 99213 OFFICE O/P EST LOW 20 MIN: CPT | Performed by: PSYCHIATRY & NEUROLOGY

## 2023-09-08 NOTE — LETTER
September 8, 2023       No Recipients    Patient: Rhonda Dong   YOB: 1970   Date of Visit: 9/8/2023     Dear [facility]:       Thank you for referring Rhonda Dong to me for evaluation. Below are the relevant portions of my assessment and plan of care.    If you have questions, please do not hesitate to call me. I look forward to following Rhonda along with you.         Sincerely,        Rodri Iniguez II, MD        CC:   No Recipients    Rodri Iniguez II, MD  09/08/23 0904  Sign when Signing Visit  Chief Complaint   Patient presents with   • Migraine       Patient ID: Rhonda Dong is a 53 y.o. female.    HPI: I had the pleasure of seeing your patient again today for.  As you may know she is a 53-year-old female here for the management of migraine headache.  She has been doing quite well since last follow-up.  She has not had any significant worsening of the headache frequency or severity.  She has noted that she will wake up with a significant amount of her headaches when she does have 1.  Within the last 30 days she reports 2 days of headaches.  Both of these were migrainous.  Currently taking Naratriptan for the onset of migraine which does work within 30 to 40 minutes for her.  If she wakes up with one and takes the nortriptyline she is able to abort the headache relatively quickly.  No specific side effects to that.  She denies symptoms neurologically.  Preventatively she is currently taking nortriptyline.  He is taking 50 mg nightly.    The following portions of the patient's history were reviewed and updated as appropriate: allergies, current medications, past family history, past medical history, past social history, past surgical history and problem list.    Review of Systems   Eyes:  Positive for photophobia and visual disturbance.   Gastrointestinal:  Negative for nausea and vomiting.   Allergic/Immunologic: Negative for environmental allergies, food allergies  and immunocompromised state.   Neurological:  Positive for headaches. Negative for dizziness, tremors, seizures, syncope, facial asymmetry, speech difficulty, weakness, light-headedness and numbness.   Hematological:  Negative for adenopathy. Does not bruise/bleed easily.    I have reviewed the review of systems above performed by my medical assistant.      Vitals:    23 0830   BP: 124/72   Pulse: 113   SpO2: 98%       Neurologic Exam     Mental Status   Oriented to person, place, and time.   Concentration: normal.   Level of consciousness: alert  Knowledge: consistent with education (No deficits found.).     Cranial Nerves     CN II   Visual fields full to confrontation.     CN III, IV, VI   Pupils are equal, round, and reactive to light.  Extraocular motions are normal.   CN III: no CN III palsy  CN VI: no CN VI palsy    CN V   Facial sensation intact.     CN VII   Facial expression full, symmetric.     CN VIII   CN VIII normal.     CN IX, X   CN IX normal.   CN X normal.     CN XI   CN XI normal.     CN XII   CN XII normal.     Motor Exam     Strength   Right neck flexion: 5/5  Left neck flexion: 5/5  Right neck extension: 5/5  Left neck extension: 5/5  Right deltoid: 5/5  Left deltoid: 5/5  Right biceps: 5/5  Left biceps: 5/5  Right triceps: 5/5  Left triceps: 5/5  Right wrist flexion: 5/5  Left wrist flexion: 5/5  Right wrist extension: 5/5  Left wrist extension: 5/5  Right interossei: 5/5  Left interossei: 5/5  Right abdominals: 5/5  Left abdominals: 5/5  Right iliopsoas: 5/5  Left iliopsoas: 5/5  Right quadriceps: 5/5  Left quadriceps: 5/5  Right hamstrin/5  Left hamstrin/5  Right glutei: 5/5  Left glutei: 5/5  Right anterior tibial: 5/5  Left anterior tibial: 5/5  Right posterior tibial: 5/5  Left posterior tibial: 5/5  Right peroneal: 5/5  Left peroneal: 5/5  Right gastroc: 5/5  Left gastroc: 5/5    Sensory Exam   Light touch normal.   Vibration normal.     Gait, Coordination, and Reflexes      Gait  Gait: normal    Reflexes   Right brachioradialis: 2+  Left brachioradialis: 2+  Right biceps: 2+  Left biceps: 2+  Right triceps: 2+  Left triceps: 2+  Right patellar: 2+  Left patellar: 2+  Right achilles: 2+  Left achilles: 2+  Right : 2+  Left : 2+Station is normal.     Physical Exam  Vitals reviewed.   Constitutional:       Appearance: She is well-developed.   HENT:      Head: Normocephalic and atraumatic.   Eyes:      Extraocular Movements: EOM normal.      Pupils: Pupils are equal, round, and reactive to light.   Cardiovascular:      Rate and Rhythm: Normal rate and regular rhythm.   Pulmonary:      Breath sounds: Normal breath sounds.   Musculoskeletal:         General: Normal range of motion.   Skin:     General: Skin is warm.   Neurological:      Mental Status: She is oriented to person, place, and time.      Gait: Gait is intact.      Deep Tendon Reflexes:      Reflex Scores:       Tricep reflexes are 2+ on the right side and 2+ on the left side.       Bicep reflexes are 2+ on the right side and 2+ on the left side.       Brachioradialis reflexes are 2+ on the right side and 2+ on the left side.       Patellar reflexes are 2+ on the right side and 2+ on the left side.       Achilles reflexes are 2+ on the right side and 2+ on the left side.      Procedures    Assessment/Plan: Our plan will be to continue with the current dose of nortriptyline as well as the nortriptyline abortively.  She will see us back in 1 year or sooner if needed.  A total of 25 minutes was spent face-to-face with the patient today.  Of that greater than 50% of this time was spent discussing signs and symptoms of migraine headaches, patient education, plan of care and prognosis.         Diagnoses and all orders for this visit:    1. Chronic migraine without aura without status migrainosus, not intractable (Primary)           Rodri Iniguez II, MD

## 2023-09-08 NOTE — TELEPHONE ENCOUNTER
Pt left w/out scheduling 1 year follow up.  Left vm for pt that scheduled for 9/10/24 @ 11:20.  Advised to call if that date and time doesn't work.  Sent reminder in mail..

## 2023-09-08 NOTE — PROGRESS NOTES
Chief Complaint   Patient presents with    Migraine       Patient ID: Rhonda Dong is a 53 y.o. female.    HPI: I had the pleasure of seeing your patient again today for.  As you may know she is a 53-year-old female here for the management of migraine headache.  She has been doing quite well since last follow-up.  She has not had any significant worsening of the headache frequency or severity.  She has noted that she will wake up with a significant amount of her headaches when she does have 1.  Within the last 30 days she reports 2 days of headaches.  Both of these were migrainous.  Currently taking Naratriptan for the onset of migraine which does work within 30 to 40 minutes for her.  If she wakes up with one and takes the nortriptyline she is able to abort the headache relatively quickly.  No specific side effects to that.  She denies symptoms neurologically.  Preventatively she is currently taking nortriptyline.  He is taking 50 mg nightly.    The following portions of the patient's history were reviewed and updated as appropriate: allergies, current medications, past family history, past medical history, past social history, past surgical history and problem list.    Review of Systems   Eyes:  Positive for photophobia and visual disturbance.   Gastrointestinal:  Negative for nausea and vomiting.   Allergic/Immunologic: Negative for environmental allergies, food allergies and immunocompromised state.   Neurological:  Positive for headaches. Negative for dizziness, tremors, seizures, syncope, facial asymmetry, speech difficulty, weakness, light-headedness and numbness.   Hematological:  Negative for adenopathy. Does not bruise/bleed easily.    I have reviewed the review of systems above performed by my medical assistant.      Vitals:    09/08/23 0830   BP: 124/72   Pulse: 113   SpO2: 98%       Neurologic Exam     Mental Status   Oriented to person, place, and time.   Concentration: normal.   Level of  consciousness: alert  Knowledge: consistent with education (No deficits found.).     Cranial Nerves     CN II   Visual fields full to confrontation.     CN III, IV, VI   Pupils are equal, round, and reactive to light.  Extraocular motions are normal.   CN III: no CN III palsy  CN VI: no CN VI palsy    CN V   Facial sensation intact.     CN VII   Facial expression full, symmetric.     CN VIII   CN VIII normal.     CN IX, X   CN IX normal.   CN X normal.     CN XI   CN XI normal.     CN XII   CN XII normal.     Motor Exam     Strength   Right neck flexion: 5/5  Left neck flexion: 5/5  Right neck extension: 5/5  Left neck extension: 5/5  Right deltoid: 5/5  Left deltoid: 5/5  Right biceps: 5/5  Left biceps: 5/5  Right triceps: 5/5  Left triceps: 5/5  Right wrist flexion: 5/5  Left wrist flexion: 5/5  Right wrist extension: 5/5  Left wrist extension: 5/5  Right interossei: 5/5  Left interossei: 5/5  Right abdominals: 5/5  Left abdominals: 5/5  Right iliopsoas: 5/5  Left iliopsoas: 5/5  Right quadriceps: 5/5  Left quadriceps: 5/5  Right hamstrin/5  Left hamstrin/5  Right glutei: 5/5  Left glutei: 5/5  Right anterior tibial: 5/5  Left anterior tibial: 5/5  Right posterior tibial: 5/5  Left posterior tibial: 5/5  Right peroneal: 5/5  Left peroneal: 5/5  Right gastroc: 5/5  Left gastroc: 5/5    Sensory Exam   Light touch normal.   Vibration normal.     Gait, Coordination, and Reflexes     Gait  Gait: normal    Reflexes   Right brachioradialis: 2+  Left brachioradialis: 2+  Right biceps: 2+  Left biceps: 2+  Right triceps: 2+  Left triceps: 2+  Right patellar: 2+  Left patellar: 2+  Right achilles: 2+  Left achilles: 2+  Right : 2+  Left : 2+Station is normal.     Physical Exam  Vitals reviewed.   Constitutional:       Appearance: She is well-developed.   HENT:      Head: Normocephalic and atraumatic.   Eyes:      Extraocular Movements: EOM normal.      Pupils: Pupils are equal, round, and reactive to light.    Cardiovascular:      Rate and Rhythm: Normal rate and regular rhythm.   Pulmonary:      Breath sounds: Normal breath sounds.   Musculoskeletal:         General: Normal range of motion.   Skin:     General: Skin is warm.   Neurological:      Mental Status: She is oriented to person, place, and time.      Gait: Gait is intact.      Deep Tendon Reflexes:      Reflex Scores:       Tricep reflexes are 2+ on the right side and 2+ on the left side.       Bicep reflexes are 2+ on the right side and 2+ on the left side.       Brachioradialis reflexes are 2+ on the right side and 2+ on the left side.       Patellar reflexes are 2+ on the right side and 2+ on the left side.       Achilles reflexes are 2+ on the right side and 2+ on the left side.      Procedures    Assessment/Plan: Our plan will be to continue with the current dose of nortriptyline as well as the nortriptyline abortively.  She will see us back in 1 year or sooner if needed.  A total of 25 minutes was spent face-to-face with the patient today.  Of that greater than 50% of this time was spent discussing signs and symptoms of migraine headaches, patient education, plan of care and prognosis.         Diagnoses and all orders for this visit:    1. Chronic migraine without aura without status migrainosus, not intractable (Primary)           Rodri Iniguez II, MD

## 2023-09-18 ENCOUNTER — SPECIALTY PHARMACY (OUTPATIENT)
Dept: NEUROLOGY | Facility: CLINIC | Age: 53
End: 2023-09-18
Payer: COMMERCIAL

## 2023-09-20 ENCOUNTER — SPECIALTY PHARMACY (OUTPATIENT)
Dept: NEUROLOGY | Facility: CLINIC | Age: 53
End: 2023-09-20
Payer: COMMERCIAL

## 2023-10-16 RX ORDER — LAMOTRIGINE 100 MG/1
100 TABLET ORAL DAILY
Qty: 30 TABLET | Refills: 5 | Status: SHIPPED | OUTPATIENT
Start: 2023-10-16 | End: 2024-10-15

## 2023-10-16 RX ORDER — NORTRIPTYLINE HYDROCHLORIDE 50 MG/1
50 CAPSULE ORAL NIGHTLY
Qty: 30 CAPSULE | Refills: 5 | Status: SHIPPED | OUTPATIENT
Start: 2023-10-16

## 2024-02-14 DIAGNOSIS — G43.709 CHRONIC MIGRAINE WITHOUT AURA WITHOUT STATUS MIGRAINOSUS, NOT INTRACTABLE: Primary | ICD-10-CM

## 2024-02-14 RX ORDER — NARATRIPTAN 2.5 MG/1
2.5 TABLET ORAL ONCE AS NEEDED
Qty: 9 TABLET | Refills: 5 | Status: SHIPPED | OUTPATIENT
Start: 2024-02-14 | End: 2024-03-15

## 2024-02-16 DIAGNOSIS — G43.709 CHRONIC MIGRAINE WITHOUT AURA WITHOUT STATUS MIGRAINOSUS, NOT INTRACTABLE: ICD-10-CM

## 2024-02-16 RX ORDER — ERENUMAB-AOOE 140 MG/ML
INJECTION, SOLUTION SUBCUTANEOUS
Qty: 1 ML | Refills: 5 | Status: SHIPPED | OUTPATIENT
Start: 2024-02-16

## 2024-04-17 RX ORDER — LAMOTRIGINE 100 MG/1
100 TABLET ORAL DAILY
Qty: 30 TABLET | Refills: 5 | Status: SHIPPED | OUTPATIENT
Start: 2024-04-17 | End: 2025-04-17

## 2024-04-17 RX ORDER — NORTRIPTYLINE HYDROCHLORIDE 50 MG/1
50 CAPSULE ORAL NIGHTLY
Qty: 30 CAPSULE | Refills: 5 | Status: SHIPPED | OUTPATIENT
Start: 2024-04-17

## 2024-09-10 ENCOUNTER — OFFICE VISIT (OUTPATIENT)
Dept: NEUROLOGY | Facility: CLINIC | Age: 54
End: 2024-09-10
Payer: COMMERCIAL

## 2024-09-10 VITALS
HEIGHT: 64 IN | BODY MASS INDEX: 45.75 KG/M2 | WEIGHT: 268 LBS | DIASTOLIC BLOOD PRESSURE: 78 MMHG | OXYGEN SATURATION: 97 % | HEART RATE: 103 BPM | SYSTOLIC BLOOD PRESSURE: 110 MMHG

## 2024-09-10 DIAGNOSIS — G43.709 CHRONIC MIGRAINE WITHOUT AURA WITHOUT STATUS MIGRAINOSUS, NOT INTRACTABLE: Primary | ICD-10-CM

## 2024-09-10 PROCEDURE — 99213 OFFICE O/P EST LOW 20 MIN: CPT | Performed by: PSYCHIATRY & NEUROLOGY

## 2024-09-10 RX ORDER — NORTRIPTYLINE HYDROCHLORIDE 75 MG/1
75 CAPSULE ORAL NIGHTLY
Qty: 30 CAPSULE | Refills: 3 | Status: SHIPPED | OUTPATIENT
Start: 2024-09-10 | End: 2024-10-10

## 2024-09-10 RX ORDER — CALCIUM CARBONATE 300MG(750)
TABLET,CHEWABLE ORAL
COMMUNITY
Start: 2023-09-09

## 2024-09-10 RX ORDER — SIMETHICONE 125 MG
CAPSULE ORAL
COMMUNITY
Start: 2024-09-01

## 2024-09-10 RX ORDER — TIRZEPATIDE 10 MG/.5ML
INJECTION, SOLUTION SUBCUTANEOUS
COMMUNITY
Start: 2024-07-01

## 2024-09-10 NOTE — PROGRESS NOTES
Chief Complaint   Patient presents with    Chronic migraine without aura without status migrainosus, n       Patient ID: Rhonda Dong is a 54 y.o. female.    HPI:  I have had the pleasure of seeing your patient today.  As you may know she is a 54-year-old female here for the management of migraine headaches.  She has noted a significant increase in the frequency of her headaches.  She does mention some stress in her workplace as a likely source of this increase.  She estimates that she has had 12 days of headaches within the last 30 days.  All of these were migrainous type headaches.  She is currently taking nortriptyline, Aimovig injections as well as Lamictal.  Abortively she is still taking Romina triptan.  That seems to work well however she admits that generally she will have to take 2.  It still may not completely abort the headache.  She denies any new migraine symptoms.    The following portions of the patient's history were reviewed and updated as appropriate: allergies, current medications, past family history, past medical history, past social history, past surgical history and problem list.    Review of Systems   Constitutional:  Positive for fatigue.   Eyes:  Positive for photophobia (during migraines) and visual disturbance (during migraines).   Gastrointestinal:  Positive for nausea (during migraines) and vomiting (during migraines).   Neurological:  Positive for dizziness (during migraines) and headaches. Negative for tremors, seizures, syncope, facial asymmetry, speech difficulty, weakness, light-headedness (during migraines) and numbness.   Psychiatric/Behavioral:  Positive for sleep disturbance. Negative for agitation, behavioral problems, confusion, decreased concentration, dysphoric mood, hallucinations, self-injury and suicidal ideas. The patient is not nervous/anxious and is not hyperactive.       I have reviewed the review of systems above performed by my medical assistant.      Vitals:     09/10/24 1153   BP: 110/78   Pulse: 103   SpO2: 97%       Neurologic Exam     Mental Status   Oriented to person, place, and time.   Concentration: normal.   Level of consciousness: alert  Knowledge: consistent with education (No deficits found.).     Cranial Nerves     CN II   Visual fields full to confrontation.     CN III, IV, VI   Pupils are equal, round, and reactive to light.  Extraocular motions are normal.   CN III: no CN III palsy  CN VI: no CN VI palsy    CN V   Facial sensation intact.     CN VII   Facial expression full, symmetric.     CN VIII   CN VIII normal.     CN IX, X   CN IX normal.   CN X normal.     CN XI   CN XI normal.     CN XII   CN XII normal.     Motor Exam     Strength   Right neck flexion: 5/5  Left neck flexion: 5/5  Right neck extension: 5/5  Left neck extension: 5/5  Right deltoid: 5/5  Left deltoid: 5/5  Right biceps: 5/5  Left biceps: 5/5  Right triceps: 5/5  Left triceps: 5/5  Right wrist flexion: 5/5  Left wrist flexion: 5/5  Right wrist extension: 5/5  Left wrist extension: 5/5  Right interossei: 5/5  Left interossei: 5/5  Right abdominals: 5/5  Left abdominals: 5/5  Right iliopsoas: 5/5  Left iliopsoas: 5/5  Right quadriceps: 5/5  Left quadriceps: 5/5  Right hamstrin/5  Left hamstrin/5  Right glutei: 5/5  Left glutei: 5/5  Right anterior tibial: 5/5  Left anterior tibial: 5/5  Right posterior tibial: 5/5  Left posterior tibial: 5/5  Right peroneal: 5/5  Left peroneal: 5/5  Right gastroc: 5/5  Left gastroc: 5/5    Sensory Exam   Light touch normal.   Vibration normal.     Gait, Coordination, and Reflexes     Gait  Gait: normal    Reflexes   Right brachioradialis: 2+  Left brachioradialis: 2+  Right biceps: 2+  Left biceps: 2+  Right triceps: 2+  Left triceps: 2+  Right patellar: 2+  Left patellar: 2+  Right achilles: 2+  Left achilles: 2+  Right : 2+  Left : 2+Station is normal.       Physical Exam  Vitals reviewed.   Constitutional:       Appearance: She is  well-developed.   HENT:      Head: Normocephalic and atraumatic.   Eyes:      Extraocular Movements: EOM normal.      Pupils: Pupils are equal, round, and reactive to light.   Cardiovascular:      Rate and Rhythm: Normal rate and regular rhythm.   Pulmonary:      Breath sounds: Normal breath sounds.   Musculoskeletal:         General: Normal range of motion.   Skin:     General: Skin is warm.   Neurological:      Mental Status: She is oriented to person, place, and time.      Gait: Gait is intact.      Deep Tendon Reflexes:      Reflex Scores:       Tricep reflexes are 2+ on the right side and 2+ on the left side.       Bicep reflexes are 2+ on the right side and 2+ on the left side.       Brachioradialis reflexes are 2+ on the right side and 2+ on the left side.       Patellar reflexes are 2+ on the right side and 2+ on the left side.       Achilles reflexes are 2+ on the right side and 2+ on the left side.        Procedures    Assessment/Plan: We are going to increase the nortriptyline to 75 mg nightly.  I have given her samples of both Ubrelvy and Nurtec to try abortively.  She will let us know which 1 works best for her.  Will see her back in about 4 months or sooner if needed.  A total of 25 minutes was spent face-to-face with the patient today.  Of that greater than 50% of this time was spent discussing signs and symptoms of migraine, patient education, plan of care and prognosis.         Diagnoses and all orders for this visit:    1. Chronic migraine without aura without status migrainosus, not intractable (Primary)  -     nortriptyline (PAMELOR) 75 MG capsule; Take 1 capsule by mouth Every Night for 30 days.  Dispense: 30 capsule; Refill: 3           Rodri Iniguez II, MD

## 2024-10-06 DIAGNOSIS — G43.709 CHRONIC MIGRAINE WITHOUT AURA WITHOUT STATUS MIGRAINOSUS, NOT INTRACTABLE: ICD-10-CM

## 2024-10-07 RX ORDER — ERENUMAB-AOOE 140 MG/ML
INJECTION, SOLUTION SUBCUTANEOUS
Qty: 1 ML | Refills: 5 | Status: SHIPPED | OUTPATIENT
Start: 2024-10-07

## 2024-10-28 ENCOUNTER — PATIENT MESSAGE (OUTPATIENT)
Dept: NEUROLOGY | Facility: CLINIC | Age: 54
End: 2024-10-28
Payer: COMMERCIAL

## 2024-10-28 DIAGNOSIS — G43.709 CHRONIC MIGRAINE WITHOUT AURA WITHOUT STATUS MIGRAINOSUS, NOT INTRACTABLE: Primary | ICD-10-CM

## 2024-11-06 DIAGNOSIS — G43.709 CHRONIC MIGRAINE WITHOUT AURA WITHOUT STATUS MIGRAINOSUS, NOT INTRACTABLE: Primary | ICD-10-CM

## 2024-11-06 RX ORDER — LAMOTRIGINE 100 MG/1
100 TABLET ORAL DAILY
Qty: 30 TABLET | Refills: 5 | Status: SHIPPED | OUTPATIENT
Start: 2024-11-06 | End: 2025-11-06

## 2024-12-09 ENCOUNTER — TELEPHONE (OUTPATIENT)
Dept: NEUROLOGY | Facility: CLINIC | Age: 54
End: 2024-12-09

## 2024-12-09 NOTE — TELEPHONE ENCOUNTER
Provider: DR. SOW    Caller: ARON GALLEGOS    Relationship to Patient:     Phone Number: [phone]    Reason for Call: ARON GALLEGOS REQ PRIOR AUTH INFO FOR PT'S AIMOVIG RX REFILL.    When was the patient last seen: 09/10/24          PLEASE REVIEW AND ADVISE

## 2024-12-10 ENCOUNTER — SPECIALTY PHARMACY (OUTPATIENT)
Dept: NEUROLOGY | Facility: CLINIC | Age: 54
End: 2024-12-10
Payer: COMMERCIAL

## 2024-12-10 ENCOUNTER — OFFICE VISIT (OUTPATIENT)
Dept: NEUROLOGY | Facility: CLINIC | Age: 54
End: 2024-12-10
Payer: COMMERCIAL

## 2024-12-10 VITALS
WEIGHT: 264.7 LBS | HEART RATE: 76 BPM | BODY MASS INDEX: 45.19 KG/M2 | OXYGEN SATURATION: 100 % | DIASTOLIC BLOOD PRESSURE: 70 MMHG | HEIGHT: 64 IN | SYSTOLIC BLOOD PRESSURE: 120 MMHG

## 2024-12-10 DIAGNOSIS — G43.709 CHRONIC MIGRAINE WITHOUT AURA WITHOUT STATUS MIGRAINOSUS, NOT INTRACTABLE: ICD-10-CM

## 2024-12-10 RX ORDER — NORTRIPTYLINE HYDROCHLORIDE 50 MG/1
100 CAPSULE ORAL NIGHTLY
Qty: 60 CAPSULE | Refills: 3 | Status: SHIPPED | OUTPATIENT
Start: 2024-12-10

## 2024-12-10 RX ORDER — SEMAGLUTIDE 2.68 MG/ML
INJECTION, SOLUTION SUBCUTANEOUS WEEKLY
COMMUNITY

## 2024-12-10 NOTE — LETTER
December 10, 2024     [facility]  [address] [address]    Patient: Rhonda Dong   YOB: 1970   Date of Visit: 12/10/2024     Dear [facility]:       Thank you for referring Rhonda Dong to me for evaluation. Below are the relevant portions of my assessment and plan of care.    If you have questions, please do not hesitate to call me. I look forward to following Rhonda along with you.         Sincerely,        [facility]        CC: No Recipients    Laila Tan APRN  12/10/24 0838  Sign when Signing Visit  DOS: 12/10/2024  NAME: Rhonda Dong   : 1970  PCP: Mellisa Larry APRN    Chief Complaint   Patient presents with   • Migraine     F/u      SUBJECTIVE  Neurological Problem:  54 y.o. right-handed female with a past medical history of TMJ, DM, HTN, HLD, renal insufficiency,  asthma, chronic right knee pain and h/o uveitis, b/l cataracts and CTS (s/p surgery on the right). They are seen in follow up today for migraine headaches, however the problem is new to the examiner. Patient last seen by Dr. Rodri Iniguez in 2024, with a summary of the history taken from the previous note with additions/modifications as indicated. She is unaccompanied.    Interval History:   Ms. Dong has been previously treated by Dr. Iniguez and [facility], Records from back to 2016 were reviewed.  She has previously tried and failed multiple treatment modalities, is currently maintained on Aimovig 140 mg every 30 days, nortriptyline 75 mg daily and Lamictal 100 mg daily for migraine prevention.  She was given samples of Ubrelvy and Nurtec to try at her last visit September.  She has a family history of migraines in her mother and her son.  She also has a long history of TMJ, wears a bite block.    She presents today in follow-up, reports with the increase of the nortriptyline to 75 mg she is down to approximately 4-6 headaches a  "month, decreased from the previous 12 headaches she reported.  She says she did wake up with a migraine yesterday that lasted several hours, she feels a \"migraine hangover\" today.  She did try both the Ubrelvy and Nurtec, prefers the Ubrelvy because she cannot stand the taste of Nurtec and feels Ubrelvy works well for her.  Denies any change to the quality or location of her headaches, no change to her symptoms of light and sound sensitivity as well as nausea.  She denies any focal or unilateral symptoms such as visual or speech deficit, facial droop or trouble swallowing, unilateral weakness or numbness to her extremities.    Current preventive therapy: Aimovig, nortriptyline 75 mg, Lamictal 100 mg daily  Current abortive treatment: Ubrelvy 100 mg, Flexeril 10 mg as needed  Failed medications: Zonegran, Zomig, riboflavin, magnesium, Medrol Dosepak, Imitrex, Lyrica, Cambia, Tegretol, amitriptyline, nortriptyline, sumatriptan, Trokendi    Review of Systems   Eyes:  Positive for photophobia.   Neurological:  Positive for headaches.        The following portions of the patient's history were reviewed and updated as appropriate: allergies, current medications, past family history, past medical history, past social history, past surgical history and problem list.    Current Medications:   Current Outpatient Medications:   •  albuterol sulfate  (90 Base) MCG/ACT inhaler, Inhale 2 puffs Every 4 (Four) Hours As Needed for Wheezing., Disp: , Rfl:   •  cyclobenzaprine (FLEXERIL) 10 MG tablet, TK 1 T PO TID PRN, Disp: , Rfl: 1  •  Erenumab-aooe (Aimovig) 140 MG/ML auto-injector, ADMINISTER 1 ML UNDER THE SKIN EVERY 30 DAYS, Disp: 1 mL, Rfl: 5  •  lamoTRIgine (LaMICtal) 100 MG tablet, Take 1 tablet by mouth Daily., Disp: 30 tablet, Rfl: 5  •  lisinopril-hydrochlorothiazide (PRINZIDE,ZESTORETIC) 20-25 MG per tablet, Take 1 tablet by mouth Daily., Disp: , Rfl:   •  Magnesium 400 MG tablet, , Disp: , Rfl:   •  metFORMIN " "(GLUCOPHAGE) 500 MG tablet, Take 1 tablet by mouth Daily., Disp: , Rfl: 10  •  montelukast (SINGULAIR) 10 MG tablet, Take 1 tablet by mouth Daily., Disp: , Rfl: 10  •  nortriptyline (PAMELOR) 50 MG capsule, Take 2 capsules by mouth Every Night., Disp: 60 capsule, Rfl: 3  •  omeprazole (priLOSEC) 20 MG capsule, Take 1 capsule by mouth 2 (two) times a day., Disp: , Rfl:   •  Oxymetazoline HCl (NASAL SPRAY NA), into the nostril(s) as directed by provider., Disp: , Rfl:   •  potassium chloride (K-DUR) 10 MEQ CR tablet, Take 1 tablet by mouth Daily., Disp: , Rfl: 10  •  rosuvastatin (CRESTOR) 40 MG tablet, Take  by mouth Daily., Disp: , Rfl: 2  •  Semaglutide, 2 MG/DOSE, (Ozempic, 2 MG/DOSE,) 8 MG/3ML solution pen-injector, Inject  under the skin into the appropriate area as directed 1 (One) Time Per Week., Disp: , Rfl:   •  simethicone (Gas-X Extra Strength) 125 MG capsule capsule, , Disp: , Rfl:   •  SPIRIVA HANDIHALER 18 MCG per inhalation capsule, INL THE CONTENTS OF 1 C VIA INHALATION DEVICE D, Disp: , Rfl: 10  •  ubrogepant (Ubrelvy) 100 MG tablet, Take 1 tablet by mouth 1 (One) Time As Needed (for migraine headache). May repeat dose in two hours if needed., Disp: 16 tablet, Rfl: 3  •  ubrogepant (Ubrelvy) 100 MG tablet, Take one tab for moderate to severe headache, may repeat once after 2 hours if needed, max 200 mg in 24 hours, Disp: 2 tablet, Rfl: 0  **I did not stop or change the above medications.  Patient's medication list was updated to reflect medications they have reported as currently taking, including medication changes made by other providers.    Objective  Vital Signs:  /70   Pulse 76   Ht 162.6 cm (64\")   Wt 120 kg (264 lb 11.2 oz)   SpO2 100%   BMI 45.44 kg/m²   Body mass index is 45.44 kg/m².    Physical Exam   Physical Exam:  GENERAL: NAD, alert  HEENT: Normocephalic, atraumatic   Resp: Even and unlabored  Extremities: No edema    Neurological:   MS: AOx3, recent/remote memory intact, " "normal attention/concentration, language intact, no neglect.   CN: visual acuity grossly normal, PERRL, EOMI, no nystagmus, no facial droop, no dysarthria  Motor: 5/5 strength all 4 ext. Normal tone and bulk. No tremor or abnormal movements noted.   Sensory: Intact to crude and light touch in all four ext.  Gait and station: Normal gait and station    Result Review:  The following data was reviewed by: CLAY Hunt on 12/10/2024:  Laboratory Results:         No results found for: \"WBC\", \"HGB\", \"HCT\", \"MCV\", \"PLT\"  No results found for: \"GLUCOSE\", \"BUN\", \"CREATININE\", \"EGFRIFNONA\", \"EGFRIFAFRI\", \"BCR\", \"K\", \"CO2\", \"CALCIUM\", \"PROTENTOTREF\", \"ALBUMIN\", \"LABIL2\", \"BILIRUBIN\", \"AST\", \"ALT\"  No results found for: \"HGBA1C\"  No results found for: \"CHOL\"  No results found for: \"HDL\"  No results found for: \"LDL\"  No results found for: \"TRIG\"  No results found for: \"RPR\"  No results found for: \"TSH\"  No results found for: \"[id number]\"               Assessment and Plan   Diagnoses and all orders for this visit:    1. Chronic migraine without aura without status migrainosus, not intractable  -     ubrogepant (Ubrelvy) 100 MG tablet; Take 1 tablet by mouth 1 (One) Time As Needed (for migraine headache). May repeat dose in two hours if needed.  Dispense: 16 tablet; Refill: 3  -     ubrogepant (Ubrelvy) 100 MG tablet; Take one tab for moderate to severe headache, may repeat once after 2 hours if needed, max 200 mg in 24 hours  Dispense: 2 tablet; Refill: 0  -     nortriptyline (PAMELOR) 50 MG capsule; Take 2 capsules by mouth Every Night.  Dispense: 60 capsule; Refill: 3    Continue Aimovig 140 mg every 30 days.  We will increase nortriptyline to 100 mg nightly to see if we can eliminate further headaches.  A prescription for Ubrelvy has been sent in today.  I have reached out to our specialty pharmacy team to follow-up on the prior Auth required for the Aimovig.    We will see Rhonda back in 4 months, sooner if symptoms " .     CLAY Hunt  [facility]   12/10/24       I spent 30 minutes caring for Rhonda on this date of service. This time includes time spent by me in the following activities:preparing for the visit, reviewing tests, obtaining and/or reviewing a separately obtained history, performing a medically appropriate examination and/or evaluation , counseling and educating the patient/family/caregiver, ordering medications, tests, or procedures, referring and communicating with other health care professionals , documenting information in the medical record, independently interpreting results and communicating that information with the patient/family/caregiver, and care coordination  Follow Up   Return in about 4 months (around 4/10/2025).  Patient was given instructions and counseling regarding her condition or for health maintenance advice. Please see specific information pulled into the AVS if appropriate.       Dictated using Dragon Dictation

## 2024-12-10 NOTE — PROGRESS NOTES
"DOS: 12/10/2024  NAME: Rhonda Dong   : 1970  PCP: Mellisa Larry APRN    Chief Complaint   Patient presents with    Migraine     F/u      SUBJECTIVE  Neurological Problem:  54 y.o. right-handed female with a past medical history of TMJ, DM, HTN, HLD, renal insufficiency,  asthma, chronic right knee pain and h/o uveitis, b/l cataracts and CTS (s/p surgery on the right). They are seen in follow up today for migraine headaches, however the problem is new to the examiner. Patient last seen by Dr. Rodri Iniguez in 2024, with a summary of the history taken from the previous note with additions/modifications as indicated. She is unaccompanied.    Interval History:   Ms. Dong has been previously treated by Dr. Iniguez and [facility], Records from back to 2016 were reviewed.  She has previously tried and failed multiple treatment modalities, is currently maintained on Aimovig 140 mg every 30 days, nortriptyline 75 mg daily and Lamictal 100 mg daily for migraine prevention.  She was given samples of Ubrelvy and Nurtec to try at her last visit September.  She has a family history of migraines in her mother and her son.  She also has a long history of TMJ, wears a bite block.    She presents today in follow-up, reports with the increase of the nortriptyline to 75 mg she is down to approximately 4-6 headaches a month, decreased from the previous 12 headaches she reported.  She says she did wake up with a migraine yesterday that lasted several hours, she feels a \"migraine hangover\" today.  She did try both the Ubrelvy and Nurtec, prefers the Ubrelvy because she cannot stand the taste of Nurtec and feels Ubrelvy works well for her.  Denies any change to the quality or location of her headaches, no change to her symptoms of light and sound sensitivity as well as nausea.  She denies any focal or unilateral symptoms such as visual or speech deficit, facial droop or trouble swallowing, " unilateral weakness or numbness to her extremities.    Current preventive therapy: Aimovig, nortriptyline 75 mg, Lamictal 100 mg daily  Current abortive treatment: Ubrelvy 100 mg, Flexeril 10 mg as needed  Failed medications: Zonegran, Zomig, riboflavin, magnesium, Medrol Dosepak, Imitrex, Lyrica, Cambia, Tegretol, amitriptyline, nortriptyline, sumatriptan, Trokendi    Review of Systems   Eyes:  Positive for photophobia.   Neurological:  Positive for headaches.        The following portions of the patient's history were reviewed and updated as appropriate: allergies, current medications, past family history, past medical history, past social history, past surgical history and problem list.    Current Medications:   Current Outpatient Medications:     albuterol sulfate  (90 Base) MCG/ACT inhaler, Inhale 2 puffs Every 4 (Four) Hours As Needed for Wheezing., Disp: , Rfl:     cyclobenzaprine (FLEXERIL) 10 MG tablet, TK 1 T PO TID PRN, Disp: , Rfl: 1    Erenumab-aooe (Aimovig) 140 MG/ML auto-injector, ADMINISTER 1 ML UNDER THE SKIN EVERY 30 DAYS, Disp: 1 mL, Rfl: 5    lamoTRIgine (LaMICtal) 100 MG tablet, Take 1 tablet by mouth Daily., Disp: 30 tablet, Rfl: 5    lisinopril-hydrochlorothiazide (PRINZIDE,ZESTORETIC) 20-25 MG per tablet, Take 1 tablet by mouth Daily., Disp: , Rfl:     Magnesium 400 MG tablet, , Disp: , Rfl:     metFORMIN (GLUCOPHAGE) 500 MG tablet, Take 1 tablet by mouth Daily., Disp: , Rfl: 10    montelukast (SINGULAIR) 10 MG tablet, Take 1 tablet by mouth Daily., Disp: , Rfl: 10    nortriptyline (PAMELOR) 50 MG capsule, Take 2 capsules by mouth Every Night., Disp: 60 capsule, Rfl: 3    omeprazole (priLOSEC) 20 MG capsule, Take 1 capsule by mouth 2 (two) times a day., Disp: , Rfl:     Oxymetazoline HCl (NASAL SPRAY NA), into the nostril(s) as directed by provider., Disp: , Rfl:     potassium chloride (K-DUR) 10 MEQ CR tablet, Take 1 tablet by mouth Daily., Disp: , Rfl: 10    rosuvastatin (CRESTOR)  "40 MG tablet, Take  by mouth Daily., Disp: , Rfl: 2    Semaglutide, 2 MG/DOSE, (Ozempic, 2 MG/DOSE,) 8 MG/3ML solution pen-injector, Inject  under the skin into the appropriate area as directed 1 (One) Time Per Week., Disp: , Rfl:     simethicone (Gas-X Extra Strength) 125 MG capsule capsule, , Disp: , Rfl:     SPIRIVA HANDIHALER 18 MCG per inhalation capsule, INL THE CONTENTS OF 1 C VIA INHALATION DEVICE D, Disp: , Rfl: 10    ubrogepant (Ubrelvy) 100 MG tablet, Take 1 tablet by mouth 1 (One) Time As Needed (for migraine headache). May repeat dose in two hours if needed., Disp: 16 tablet, Rfl: 3    ubrogepant (Ubrelvy) 100 MG tablet, Take one tab for moderate to severe headache, may repeat once after 2 hours if needed, max 200 mg in 24 hours, Disp: 2 tablet, Rfl: 0  **I did not stop or change the above medications.  Patient's medication list was updated to reflect medications they have reported as currently taking, including medication changes made by other providers.    Objective   Vital Signs:  /70   Pulse 76   Ht 162.6 cm (64\")   Wt 120 kg (264 lb 11.2 oz)   SpO2 100%   BMI 45.44 kg/m²   Body mass index is 45.44 kg/m².    Physical Exam   Physical Exam:  GENERAL: NAD, alert  HEENT: Normocephalic, atraumatic   Resp: Even and unlabored  Extremities: No edema    Neurological:   MS: AOx3, recent/remote memory intact, normal attention/concentration, language intact, no neglect.   CN: visual acuity grossly normal, PERRL, EOMI, no nystagmus, no facial droop, no dysarthria  Motor: 5/5 strength all 4 ext. Normal tone and bulk. No tremor or abnormal movements noted.   Sensory: Intact to crude and light touch in all four ext.  Gait and station: Normal gait and station    Result Review :  The following data was reviewed by: CLAY Hunt on 12/10/2024:  Laboratory Results:         No results found for: \"WBC\", \"HGB\", \"HCT\", \"MCV\", \"PLT\"  No results found for: \"GLUCOSE\", \"BUN\", \"CREATININE\", \"EGFRIFNONA\", " "\"EGFRIFAFRI\", \"BCR\", \"K\", \"CO2\", \"CALCIUM\", \"PROTENTOTREF\", \"ALBUMIN\", \"LABIL2\", \"BILIRUBIN\", \"AST\", \"ALT\"  No results found for: \"HGBA1C\"  No results found for: \"CHOL\"  No results found for: \"HDL\"  No results found for: \"LDL\"  No results found for: \"TRIG\"  No results found for: \"RPR\"  No results found for: \"TSH\"  No results found for: \"[id number]\"               Assessment and Plan   Diagnoses and all orders for this visit:    1. Chronic migraine without aura without status migrainosus, not intractable  -     ubrogepant (Ubrelvy) 100 MG tablet; Take 1 tablet by mouth 1 (One) Time As Needed (for migraine headache). May repeat dose in two hours if needed.  Dispense: 16 tablet; Refill: 3  -     ubrogepant (Ubrelvy) 100 MG tablet; Take one tab for moderate to severe headache, may repeat once after 2 hours if needed, max 200 mg in 24 hours  Dispense: 2 tablet; Refill: 0  -     nortriptyline (PAMELOR) 50 MG capsule; Take 2 capsules by mouth Every Night.  Dispense: 60 capsule; Refill: 3    Continue Aimovig 140 mg every 30 days.  We will increase nortriptyline to 100 mg nightly to see if we can eliminate further headaches.  A prescription for Ubrelvy has been sent in today.  I have reached out to our specialty pharmacy team to follow-up on the prior Auth required for the Aimovig.    We will see Rhonda back in 4 months, sooner if symptoms warrant.     CLAY Hunt  [facility]   12/10/24       I spent 30 minutes caring for Rhonda on this date of service. This time includes time spent by me in the following activities:preparing for the visit, reviewing tests, obtaining and/or reviewing a separately obtained history, performing a medically appropriate examination and/or evaluation , counseling and educating the patient/family/caregiver, ordering medications, tests, or procedures, referring and communicating with other health care professionals , documenting information in the medical record, independently " interpreting results and communicating that information with the patient/family/caregiver, and care coordination  Follow Up   Return in about 4 months (around 4/10/2025).  Patient was given instructions and counseling regarding her condition or for health maintenance advice. Please see specific information pulled into the AVS if appropriate.       Dictated using Dragon Dictation

## 2024-12-16 ENCOUNTER — SPECIALTY PHARMACY (OUTPATIENT)
Dept: NEUROLOGY | Facility: CLINIC | Age: 54
End: 2024-12-16
Payer: COMMERCIAL

## 2024-12-16 DIAGNOSIS — G43.709 CHRONIC MIGRAINE WITHOUT AURA WITHOUT STATUS MIGRAINOSUS, NOT INTRACTABLE: ICD-10-CM

## 2024-12-16 RX ORDER — ERENUMAB-AOOE 140 MG/ML
INJECTION, SOLUTION SUBCUTANEOUS
Qty: 1 ML | Refills: 5 | Status: SHIPPED | OUTPATIENT
Start: 2024-12-16

## 2024-12-16 NOTE — PROGRESS NOTES
Specialty Pharmacy Patient Management Program  Neurology Initial Assessment     Rhonda Dong is a 54 y.o. female with chronic migraine seen by a Neurology provider and enrolled in the Neurology Patient Management program offered by [facility].  An initial outreach was conducted, including assessment of therapy appropriateness and specialty medication education for erenumab (Aimovig) and ubrogepant (Ubrelvy). The patient was introduced to services offered by [facility], including: regular assessments, refill coordination, curbside pick-up or mail order delivery options, prior authorization maintenance, and financial assistance programs as applicable. The patient was also provided with contact information for the pharmacy team.     Insurance Coverage & Financial Support  [employer] / [employer] and copay cards.    Relevant Past Medical History and Comorbidities  Relevant medical history and concomitant health conditions were discussed with the patient. The patient's chart has been reviewed for relevant past medical history and comorbid health conditions and updated as necessary.   Past Medical History:   Diagnosis Date    Arthritis     Asthma     CTS (carpal tunnel syndrome)     Diabetes mellitus     Hypertension     Migraine     Osteoarthritis     Trigeminal neuralgia      Social History     Socioeconomic History    Marital status: Single   Tobacco Use    Smoking status: Never    Smokeless tobacco: Never   Vaping Use    Vaping status: Never Used   Substance and Sexual Activity    Alcohol use: Yes     Alcohol/week: 1.0 standard drink of alcohol     Types: 1 Glasses of wine per week    Drug use: No    Sexual activity: Defer     Problem list reviewed by Martinez Robertson RPH on 12/16/2024 at 11:25 AM      Allergies  Known allergies and reactions were discussed with the patient. The patient's chart has been reviewed for  allergy information and updated as  necessary.   Allergies   Allergen Reactions    Shrimp Shortness Of Breath    Aspirin Unknown (See Comments)    Baclofen Unknown (See Comments)     unknown    Iodine Unknown (See Comments)    Norplant [Levonorgestrel] Unknown (See Comments)     unknown    Other Unknown (See Comments)     BIRTH CONTROL PILLS    Latex Rash     Allergies reviewed by Martinez Robertson [facility] on 12/16/2024 at 11:25 AM        Lab Assessment  Labs have been reviewed. No dose adjustments are needed for the specialty medication(s) based on the labs.       Current Medication List  This medication list has been reviewed with the patient and evaluated for any interactions or necessary modifications/recommendations, and updated to include all prescription medications, OTC medications, and supplements the patient is currently taking.  This list reflects what is contained in the patient's profile, which has also been marked as reviewed to communicate to other providers it is the most up to date version of the patient's current medication therapy.     Current Outpatient Medications:     Erenumab-aooe (Aimovig) 140 MG/ML auto-injector, ADMINISTER 1 ML UNDER THE SKIN EVERY 30 DAYS, Disp: 1 mL, Rfl: 5    albuterol sulfate  (90 Base) MCG/ACT inhaler, Inhale 2 puffs Every 4 (Four) Hours As Needed for Wheezing., Disp: , Rfl:     cyclobenzaprine (FLEXERIL) 10 MG tablet, TK 1 T PO TID PRN, Disp: , Rfl: 1    lamoTRIgine (LaMICtal) 100 MG tablet, Take 1 tablet by mouth Daily., Disp: 30 tablet, Rfl: 5    lisinopril-hydrochlorothiazide (PRINZIDE,ZESTORETIC) 20-25 MG per tablet, Take 1 tablet by mouth Daily., Disp: , Rfl:     Magnesium 400 MG tablet, , Disp: , Rfl:     metFORMIN (GLUCOPHAGE) 500 MG tablet, Take 1 tablet by mouth Daily., Disp: , Rfl: 10    montelukast (SINGULAIR) 10 MG tablet, Take 1 tablet by mouth Daily., Disp: , Rfl: 10    nortriptyline (PAMELOR) 50 MG capsule, Take 2 capsules by mouth Every Night., Disp: 60 capsule, Rfl: 3    omeprazole  (priLOSEC) 20 MG capsule, Take 1 capsule by mouth 2 (two) times a day., Disp: , Rfl:     Oxymetazoline HCl (NASAL SPRAY NA), into the nostril(s) as directed by provider., Disp: , Rfl:     potassium chloride (K-DUR) 10 MEQ CR tablet, Take 1 tablet by mouth Daily., Disp: , Rfl: 10    rosuvastatin (CRESTOR) 40 MG tablet, Take  by mouth Daily., Disp: , Rfl: 2    Semaglutide, 2 MG/DOSE, (Ozempic, 2 MG/DOSE,) 8 MG/3ML solution pen-injector, Inject  under the skin into the appropriate area as directed 1 (One) Time Per Week., Disp: , Rfl:     simethicone (Gas-X Extra Strength) 125 MG capsule capsule, , Disp: , Rfl:     SPIRIVA HANDIHALER 18 MCG per inhalation capsule, INL THE CONTENTS OF 1 C VIA INHALATION DEVICE D, Disp: , Rfl: 10    ubrogepant (Ubrelvy) 100 MG tablet, Take 1 tablet by mouth 1 (One) Time As Needed (for migraine headache). May repeat dose in two hours if needed., Disp: 16 tablet, Rfl: 3    ubrogepant (Ubrelvy) 100 MG tablet, Take one tab for moderate to severe headache, may repeat once after 2 hours if needed, max 200 mg in 24 hours, Disp: 2 tablet, Rfl: 0    Medicines reviewed by Martinez Robertson [facility] on 12/16/2024 at 11:25 AM    Drug Interactions  None identified.       Initial Education Provided for Specialty Medication  The patient has been provided with the following education and any applicable administration techniques (i.e. self-injection) have been demonstrated for the therapies indicated. All questions and concerns have been addressed prior to the patient receiving the medication, and the patient has verbalized understanding of the education and any materials provided.  Additional patient education shall be provided and documented upon request by the patient, provider or payer.      Aimovig (Erenumab-aoee) 140 mg subcutaneous every 30 days    Medication Expectations   Why am I taking this medication? You are taking this medication for migraine prophylaxis.   What should I expect while on this  medication? You should expect to a decrease in the frequency and severity of your migraines.   How does the medication work? Aimovig is a monoclonal antibody that binds to calcitonin gene-related peptide (CGRP) and blocks its binding to the receptor decreasing the severity of migraines.   How long will I be on this medication for? The amount of time you will be on this medication will be determined by your doctor and your response to the medication.    How do I take this medication? Take as directed on your prescription label. This medication is a self-injection given every 30 days.    What are some possible side effects? Injection site reactions and hypersensitivity reactions, constipation, new or worsening hypertension.   What happens if I miss a dose? If you miss a dose, take it as soon as you remember, and time next dose 30 days from last dose.      Medication Safety   What are things I should warn my doctor immediately about? Hypersensitivity reactions, development or worsening of hypertension that may occur anytime during treatment. Tell you doctor if you develop new or worsening hypertension    What are things that I should be cautious of? Injection site reaction, constipation, new or worsening hypertension.   What are some medications that can interact with this one? No drug interactions identified.      Medication Storage/Handling   How should I handle this medication? Keep this medication our of reach of pets/children in original container.  On the day your Aimovig is due let it set at room temperature for 30 minutes prior to injection. (do NOT warm using a heat source such as hot water or a microwave).  Administer in the abdomen, thigh, back of the upper arm, or buttocks.  Do not inject where the skin is tender, bruised, red or hard.  Rotate injection sites.   How does this medication need to be stored? Store in refrigerator and keep dry.   How should I dispose of this medication? You can dispose of the  empty syringe in a sharps container, and if this is not available you may use an empty hard plastic container such as a milk jug or tide container.      Resources/Support   How can I remind myself to take this medication? You can download a reminder sara on your phone or use a calandar  to help with your monthly injection.   Is financial support available?  Yes, [employer] and [employer] can provide co-pay cards if you have commercial insurance or patient assistance if you have Medicare or no insurance.    Which vaccines are recommended for me? Talk to your doctor about these vaccines: Flu, Coronavirus (COVID-19), Pneumococcal (pneumonia), Tdap, Hepatitis B, Zoster (shingles)                  Ubrelvy (Ubrogepant) 100 mg tablet, take 1 tablet by mouth as needed for headache symptoms. May repeat 1 tablet in 2 hours for continued symptoms. Max of 2 tablets over 24 hours.   Medication Expectations   Why am I taking this medication? You are taking this medication to treat acute migraines.   What should I expect while on this medication? You should expect to see a decrease in the frequency and severity of your migraines.   How does the medication work? Ubrelvy is a small molecule that binds to calcitonin gene-related peptide (CGRP) and blocks its binding to the receptor decreasing the severity of migraines.   How long will I be on this medication for? The amount of time you will be on this medication will be determined by your doctor and your response to the medication.    How do I take this medication? Take as directed on your prescription label. Reviewed plan for ubrogepant 100 mg (1 tablet) PO daily PRN; may repeat 1 tablet in 2 hours, if needed. Max dose per day is 200 mg over 24 hours.    What are some possible side effects? Potential side effects including, but not limited to nausea and somnolence. Patient verbalized understanding.   What happens if I miss a dose? This is taken as needed for migraines.     Medication Safety    What are things I should warn my doctor immediately about? Allergic reaction: Itching or hives, swelling in your face or hands, swelling or tingling in your mouth or throat, chest tightness, trouble breathing     What are things that I should be cautious of? Tell your doctor if you are pregnant or breastfeeding, or if you have kidney disease or liver disease.   What are some medications that can interact with this one? Concomitant use of strong CY inhibitors, check with your pharmacist or provider before starting any new medications, avoid grapefruit juice.     Medication Storage/Handling   How should I handle this medication? Keep this medication out of reach of pets/children.   How does this medication need to be stored? Store at a controlled room temperature between 20 and 25 degrees C (68 and 77 degrees F), with excursions permitted between 15 and 30 degrees C (59 and 86 degrees F) away from direct sunlight and moisture.   How should I dispose of this medication? There should not be a need to dispose of this medication unless your provider decides to change the dose or therapy. If that is the case, take to your local police station for proper disposal. Some pharmacies also have take-back bins for medication drop-off.      Resources/Support   How can I remind myself to take this medication? This is taken as needed for migraines.   Is financial support available?  Yes, [employer] can provide co-pay cards if you have commercial insurance or patient assistance if you have Medicare or no insurance.    Which vaccines are recommended for me? Talk to your doctor about these vaccines: Flu, Coronavirus (COVID-19), Pneumococcal (pneumonia), Tdap, Hepatitis B, Zoster (shingles)          Adherence and Self-Administration  Adherence related to the patient's specialty therapy was discussed with the patient. The Adherence segment of this outreach has been reviewed and updated.   Is there a concern with  patient's ability to self administer the medication correctly and without issue?: No  Were any potential barriers to adherence identified during the initial assessment or patient education?: No  Are there any concerns regarding the patient's understanding of the importance of medication adherence?: No  Methods for Supporting Patient Adherence and/or Self-Administration: no further support needed at this time.     Goals of Therapy  Goals related to the patient's specialty therapy were discussed with the patient. The Patient Goals segment of this outreach has been reviewed and updated.   Goals Addressed Today        Specialty Pharmacy General Goal      Decrease migraine frequency and severity. As of 12/16/24, patient currently on erenumab 140 mg, increasing nortriptyline dose to 100 mg nightly, and starting ubrogepant. As of 12/16, patient reports having about 4-6 headaches a month, decreased from the previous 12 headaches she reported. She reports an average migraine severity of ~8/10 on a pain scale from 0-10.                  Reassessment Plan & Follow-Up  Medication Therapy Changes: Continuing erenumab, increasing dose of nortriptyline for prevention, and starting ubrogepant for acute treatment of migraines.  Related Plans, Therapy Recommendations, or Therapy Problems to Be Addressed: Nothing further to be addressed at this time.   Pharmacist to perform regular reassessments no more than (6) months from the previous assessment.  Care Coordinator to set up future refill outreaches, coordinate prescription delivery, and escalate clinical questions to pharmacist.   Welcome information and patient satisfaction survey to be sent by specialty pharmacy team with patient's initial fill.    Attestation  Therapeutic appropriateness: Appropriate   I attest the patient was actively involved in and has agreed to the above plan of care. If the prescribed therapy is at any point deemed not appropriate based on the current or  future assessments, a consultation will be initiated with the patient's specialty care provider to determine the best course of action. The revised plan of therapy will be documented along with any additional patient education provided. Discussed aforementioned material with patient by phone.    Shayan NathanD, [facility]  Clinic Specialty Pharmacist, Neurology  12/16/2024  11:29 EST

## 2025-01-02 DIAGNOSIS — G43.709 CHRONIC MIGRAINE WITHOUT AURA WITHOUT STATUS MIGRAINOSUS, NOT INTRACTABLE: ICD-10-CM

## 2025-01-02 RX ORDER — NORTRIPTYLINE HYDROCHLORIDE 75 MG/1
75 CAPSULE ORAL NIGHTLY
Qty: 30 CAPSULE | Refills: 3 | OUTPATIENT
Start: 2025-01-02

## 2025-01-15 ENCOUNTER — SPECIALTY PHARMACY (OUTPATIENT)
Dept: NEUROLOGY | Facility: CLINIC | Age: 55
End: 2025-01-15
Payer: COMMERCIAL

## 2025-01-15 NOTE — PROGRESS NOTES
Specialty Pharmacy Refill Coordination Note     Rhonda is a 54 y.o. female contacted today regarding refills of Aimovig and Ubrelvy specialty medication(s).    Reviewed and verified with patient:       Specialty medication(s) and dose(s) confirmed: yes    Refill Questions      Flowsheet Row Most Recent Value   Changes to allergies? No   Changes to medications? No   New conditions or infections since last clinic visit No   Unplanned office visit, urgent care, ED, or hospital admission in the last 4 weeks  No   How does patient/caregiver feel medication is working? Very good   Financial problems or insurance changes  No   Since the previous refill, were any specialty medication doses or scheduled injections missed or delayed?  No   Does this patient require a clinical escalation to a pharmacist? No            Delivery Questions      Flowsheet Row Most Recent Value   Delivery method UPS   Delivery address verified with patient/caregiver? Yes   Delivery address Home   Number of medications in delivery 2   Medication(s) being filled and delivered Ubrogepant (UBRELVY), Erenumab-aooe (Aimovig)   Doses left of specialty medications 0 Aimovig, 6 Ubrelvy   Copay verified? Yes   Copay amount $5   Copay form of payment Credit/debit on file   Ship Date 1/16   Delivery Date 1/17   Signature Required No                   Follow-up: 21 day(s)     Leila Winter, Pharmacy Technician  Specialty Pharmacy Technician

## 2025-02-03 ENCOUNTER — PATIENT MESSAGE (OUTPATIENT)
Dept: NEUROLOGY | Facility: CLINIC | Age: 55
End: 2025-02-03
Payer: COMMERCIAL

## 2025-02-04 DIAGNOSIS — G43.709 CHRONIC MIGRAINE WITHOUT AURA WITHOUT STATUS MIGRAINOSUS, NOT INTRACTABLE: Primary | ICD-10-CM

## 2025-02-04 RX ORDER — METHYLPREDNISOLONE 4 MG/1
TABLET ORAL
Qty: 21 TABLET | Refills: 0 | Status: SHIPPED | OUTPATIENT
Start: 2025-02-04

## 2025-02-11 ENCOUNTER — SPECIALTY PHARMACY (OUTPATIENT)
Dept: NEUROLOGY | Facility: CLINIC | Age: 55
End: 2025-02-11
Payer: COMMERCIAL

## 2025-02-11 NOTE — PROGRESS NOTES
Specialty Pharmacy Patient Management Program  Refill Outreach     Rhonda was contacted today regarding refills of their medication(s).    Refill Questions      Flowsheet Row Most Recent Value   Changes to allergies? No   Changes to medications? No   New conditions or infections since last clinic visit No   Unplanned office visit, urgent care, ED, or hospital admission in the last 4 weeks  No   How does patient/caregiver feel medication is working? Very good   Financial problems or insurance changes  No   Since the previous refill, were any specialty medication doses or scheduled injections missed or delayed?  No   Does this patient require a clinical escalation to a pharmacist? No            Delivery Questions      Flowsheet Row Most Recent Value   Delivery method UPS   Delivery address verified with patient/caregiver? Yes   Delivery address Home   Number of medications in delivery 2   Medication(s) being filled and delivered Ubrogepant (UBRELVY), Erenumab-aooe (Aimovig)   Doses left of specialty medications 0 Aimovig, 6 Ubrelvy   Copay verified? Yes   Copay amount $5   Copay form of payment Credit/debit on file   Ship Date 2/12   Delivery Date Selection 02/13/25   Signature Required No                 Follow-up: 21 day(s)     Leila Winter, Pharmacy Technician  2/11/2025  12:43 EST

## 2025-03-07 ENCOUNTER — SPECIALTY PHARMACY (OUTPATIENT)
Dept: NEUROLOGY | Facility: CLINIC | Age: 55
End: 2025-03-07
Payer: COMMERCIAL

## 2025-03-07 NOTE — PROGRESS NOTES
Specialty Pharmacy Patient Management Program  Refill Outreach     Rhonda was contacted today regarding refills of their medication(s).    Refill Questions      Flowsheet Row Most Recent Value   Changes to allergies? No   Changes to medications? No   New conditions or infections since last clinic visit No   Unplanned office visit, urgent care, ED, or hospital admission in the last 4 weeks  No   How does patient/caregiver feel medication is working? Good   Financial problems or insurance changes  No   Since the previous refill, were any specialty medication doses or scheduled injections missed or delayed?  No   Does this patient require a clinical escalation to a pharmacist? No            Delivery Questions      Flowsheet Row Most Recent Value   Delivery method UPS   Delivery address verified with patient/caregiver? Yes   Delivery address Home   Number of medications in delivery 3   Medication(s) being filled and delivered Ubrogepant (UBRELVY), Nortriptyline HCl (PAMELOR), Erenumab-aooe (Aimovig)   Doses left of specialty medications 0 Aimovig, 6 Ubrelvy   Copay verified? Yes   Copay amount $20   Copay form of payment Credit/debit on file   Delivery Date Selection 03/11/25   Signature Required No                 Follow-up: 21 day(s)     Leila Winter, Pharmacy Technician  3/7/2025  13:33 EST

## 2025-04-10 ENCOUNTER — OFFICE VISIT (OUTPATIENT)
Dept: NEUROLOGY | Facility: CLINIC | Age: 55
End: 2025-04-10
Payer: COMMERCIAL

## 2025-04-10 VITALS
HEIGHT: 64 IN | SYSTOLIC BLOOD PRESSURE: 118 MMHG | OXYGEN SATURATION: 97 % | HEART RATE: 86 BPM | WEIGHT: 260 LBS | BODY MASS INDEX: 44.39 KG/M2 | DIASTOLIC BLOOD PRESSURE: 80 MMHG

## 2025-04-10 DIAGNOSIS — G43.719 INTRACTABLE CHRONIC MIGRAINE WITHOUT AURA AND WITHOUT STATUS MIGRAINOSUS: ICD-10-CM

## 2025-04-10 RX ORDER — ERENUMAB-AOOE 140 MG/ML
140 INJECTION, SOLUTION SUBCUTANEOUS
Qty: 1 ML | Refills: 5 | Status: SHIPPED | OUTPATIENT
Start: 2025-04-10

## 2025-04-10 RX ORDER — NARATRIPTAN 2.5 MG/1
2.5 TABLET ORAL AS NEEDED
Qty: 12 TABLET | Refills: 4 | Status: SHIPPED | OUTPATIENT
Start: 2025-04-10

## 2025-04-10 RX ORDER — LAMOTRIGINE 100 MG/1
150 TABLET ORAL DAILY
Qty: 30 TABLET | Refills: 5 | Status: SHIPPED | OUTPATIENT
Start: 2025-04-10

## 2025-04-10 NOTE — PROGRESS NOTES
DOS: 4/10/2025  NAME: Rhonda Dong   : 1970  PCP: Mellisa Larry APRN    Chief Complaint   Patient presents with    Migraine     F/u      SUBJECTIVE  Neurological Problem:  54 y.o. right-handed female with a past medical history of TMJ, DM, HTN, HLD, renal insufficiency,  asthma, chronic right knee pain and h/o uveitis, b/l cataracts and CTS (s/p surgery on the right). They are seen in follow up today for chronic migraine headaches. A summary of the history was taken from the previous note with additions/modifications as indicated. She is unaccompanied.    Interval History:   **For detailed interval history see progress note dated 12/10/24.    Ms. Dong follows with our clinic for chronic migraine headaches.  Most recently she has been maintained on Aimovig 140 mg monthly, nortriptyline 100 mg nightly and Lamictal 100 mg daily for migraine prevention, Ubrelvy 100 mg PRN and Flexeril 10 mg PRN for rescue.     She presents today and unfortunately has experienced an increase in the number of migraine headaches. She denies any specific triggers but states her job is stressful; she is in process of interviewing elsewhere. She denies any missed medications, denies any changes to the location, quality, or associated symptoms of her headaches. She denies any focal symptoms, no visual or speech change, unilateral extremity weakness/numbness, facial droop. No recent labs or imaging to review.     Current preventive therapy: Aimovig, nortriptyline 100 mg, Lamictal 100 mg daily  Current abortive treatment: Ubrelvy 100 mg, Flexeril 10 mg as needed  Failed medications: Zonegran, Zomig, riboflavin, magnesium, Medrol Dosepak, Imitrex, Lyrica, Cambia, Tegretol, amitriptyline, nortriptyline, sumatriptan, Trokendi    Review of Systems   HENT:  Negative for trouble swallowing.    Eyes:  Negative for visual disturbance.   Neurological:  Positive for headaches. Negative for facial asymmetry, speech difficulty, weakness  and numbness.        The following portions of the patient's history were reviewed and updated as appropriate: allergies, current medications, past family history, past medical history, past social history, past surgical history and problem list.    Current Medications:   Current Outpatient Medications:     albuterol sulfate  (90 Base) MCG/ACT inhaler, Inhale 2 puffs Every 4 (Four) Hours As Needed for Wheezing., Disp: , Rfl:     cyclobenzaprine (FLEXERIL) 10 MG tablet, TK 1 T PO TID PRN, Disp: , Rfl: 1    Erenumab-aooe (Aimovig) 140 MG/ML auto-injector, ADMINISTER 1 ML UNDER THE SKIN EVERY 28 DAYS, Disp: 1 mL, Rfl: 5    lamoTRIgine (LaMICtal) 100 MG tablet, Take 1.5 tablets by mouth Daily., Disp: 30 tablet, Rfl: 5    lisinopril-hydrochlorothiazide (PRINZIDE,ZESTORETIC) 20-25 MG per tablet, Take 1 tablet by mouth Daily., Disp: , Rfl:     montelukast (SINGULAIR) 10 MG tablet, Take 1 tablet by mouth Daily., Disp: , Rfl: 10    nortriptyline (PAMELOR) 50 MG capsule, Take 2 capsules by mouth Every Night., Disp: 60 capsule, Rfl: 3    omeprazole (priLOSEC) 20 MG capsule, Take 1 capsule by mouth 2 (two) times a day., Disp: , Rfl:     Oxymetazoline HCl (NASAL SPRAY NA), into the nostril(s) as directed by provider., Disp: , Rfl:     potassium chloride (K-DUR) 10 MEQ CR tablet, Take 1 tablet by mouth Daily., Disp: , Rfl: 10    rosuvastatin (CRESTOR) 40 MG tablet, Take  by mouth Daily., Disp: , Rfl: 2    SPIRIVA HANDIHALER 18 MCG per inhalation capsule, INL THE CONTENTS OF 1 C VIA INHALATION DEVICE D, Disp: , Rfl: 10    ubrogepant (Ubrelvy) 100 MG tablet, Take 1 tablet by mouth 1 (One) Time As Needed (for migraine headache). May repeat dose in two hours if needed., Disp: 16 tablet, Rfl: 3    Magnesium 400 MG tablet, , Disp: , Rfl:     metFORMIN (GLUCOPHAGE) 500 MG tablet, Take 1 tablet by mouth Daily., Disp: , Rfl: 10    methylPREDNISolone (MEDROL) 4 MG dose pack, Take as directed on package instructions., Disp: 21  "tablet, Rfl: 0    naratriptan (AMERGE) 2.5 MG tablet, Take 1 tablet by mouth As Needed for Migraine. May repeat dose in two hours if needed. Do not take more than two doses in 24 hours., Disp: 12 tablet, Rfl: 4    Semaglutide, 2 MG/DOSE, (Ozempic, 2 MG/DOSE,) 8 MG/3ML solution pen-injector, Inject  under the skin into the appropriate area as directed 1 (One) Time Per Week., Disp: , Rfl:     simethicone (Gas-X Extra Strength) 125 MG capsule capsule, , Disp: , Rfl:     ubrogepant (Ubrelvy) 100 MG tablet, Take one tab for moderate to severe headache, may repeat once after 2 hours if needed, max 200 mg in 24 hours, Disp: 2 tablet, Rfl: 0  **I did not stop or change the above medications.  Patient's medication list was updated to reflect medications they have reported as currently taking, including medication changes made by other providers.    Objective   Vital Signs:  /80   Pulse 86   Ht 162.6 cm (64\")   Wt 118 kg (260 lb)   SpO2 97%   BMI 44.63 kg/m²   Body mass index is 44.63 kg/m².    Physical Exam   Physical Exam:  GENERAL: NAD, alert  HEENT: Normocephalic, atraumatic   Resp: Even and unlabored    Neurological:   MS: AOx3, recent/remote memory intact, normal attention/concentration, language intact, no neglect  CN: visual acuity grossly normal, PERRL, EOMI, no facial droop, no dysarthria  Motor: Moves all four extremities symmetrically and against gravity Normal tone and bulk. No tremor or abnormal movements noted.   Sensory: Intact to crude touch in all four ext.  Gait and station: Normal gait and station    Result Review :  The following data was reviewed by: CLAY Hunt on 04/10/2025:  Laboratory Results:         No results found for: \"WBC\", \"HGB\", \"HCT\", \"MCV\", \"PLT\"  No results found for: \"GLUCOSE\", \"BUN\", \"CREATININE\", \"EGFRIFNONA\", \"EGFRIFAFRI\", \"BCR\", \"K\", \"CO2\", \"CALCIUM\", \"PROTENTOTREF\", \"ALBUMIN\", \"LABIL2\", \"BILIRUBIN\", \"AST\", \"ALT\"  No results found for: \"HGBA1C\"  No results found " "for: \"CHOL\"  No results found for: \"HDL\"  No results found for: \"LDL\"  No results found for: \"TRIG\"  No results found for: \"RPR\"  No results found for: \"TSH\"  No results found for: \"FWTXOEGZ25\"               Assessment and Plan   Diagnoses and all orders for this visit:    1. Intractable chronic migraine without aura and without status migrainosus  -     naratriptan (AMERGE) 2.5 MG tablet; Take 1 tablet by mouth As Needed for Migraine. May repeat dose in two hours if needed. Do not take more than two doses in 24 hours.  Dispense: 12 tablet; Refill: 4  -     lamoTRIgine (LaMICtal) 100 MG tablet; Take 1.5 tablets by mouth Daily.  Dispense: 30 tablet; Refill: 5  -     Erenumab-aooe (Aimovig) 140 MG/ML auto-injector; ADMINISTER 1 ML UNDER THE SKIN EVERY 28 DAYS  Dispense: 1 mL; Refill: 5        We will increase lamotrigine to 150 mg daily. Continue nortriptyline 100 mg nightly and Aimovig every 28 days. Will prescribe naratriptan 2.5 mg PRN, she states this medication is helpful for nighttime migraines, allows her to rest. Educated on not using triptans with Ubrelvy. Discussed Vyepti - she would like to try increase of lamotrigine first.     We will see Rhonda back in 2-3 months, sooner if symptoms warrant.     CLAY Hunt  [facility] [place] [facility]   04/10/25       I spent 30 minutes caring for Rhonda on this date of service. This time includes time spent by me in the following activities:preparing for the visit, obtaining and/or reviewing a separately obtained history, performing a medically appropriate examination and/or evaluation , counseling and educating the patient/family/caregiver, ordering medications, tests, or procedures, documenting information in the medical record, and care coordination  Follow Up   No follow-ups on file.  Patient was given instructions and counseling regarding her condition or for health maintenance advice. Please see specific information pulled into the AVS if appropriate. "       Dictated using Dragon Dictation    As of April 2021, as required by the Federal 21st Century Cures Act, medical records (including provider notes and laboratory/imaging results) are to be made available to patient’s and/or their designees as soon as the documents are signed/resulted. While the intention is to ensure transparency and to engage the patient in their healthcare, this immediate access may create unintended consequences as this document uses language intended for communication between medical experts and diagnostic results are interpreted with the entirety of the patient’s clinical picture in mind. It is recommended that patients and/or their designees review all available information with their primary or specialist providers for explanation and guidance to avoid misinterpretation based on layperson understanding, non-medical expert opinions, or Internet searches.

## 2025-04-10 NOTE — LETTER
April 10, 2025     [facility]  [address] [address]    Patient: Rhonda Dong   YOB: 1970   Date of Visit: 4/10/2025     Dear [facility]:       Thank you for referring Rhonda Dong to me for evaluation. Below are the relevant portions of my assessment and plan of care.    If you have questions, please do not hesitate to call me. I look forward to following Rhonda along with you.         Sincerely,        CLAY Hunt        CC: No Recipients    Laila Tan APRN  04/10/25 0840  Sign when Signing Visit  DOS: 4/10/2025  NAME: Rhonda Dong   : 1970  PCP: Mellisa Larry APRN    Chief Complaint   Patient presents with   • Migraine     F/u      SUBJECTIVE  Neurological Problem:  54 y.o. right-handed female with a past medical history of TMJ, DM, HTN, HLD, renal insufficiency,  asthma, chronic right knee pain and h/o uveitis, b/l cataracts and CTS (s/p surgery on the right). They are seen in follow up today for chronic migraine headaches. A summary of the history was taken from the previous note with additions/modifications as indicated. She is unaccompanied.    Interval History:   **For detailed interval history see progress note dated 12/10/24.    Ms. Dong follows with our clinic for chronic migraine headaches.  Most recently she has been maintained on Aimovig 140 mg monthly, nortriptyline 100 mg nightly and Lamictal 100 mg daily for migraine prevention, Ubrelvy 100 mg PRN and Flexeril 10 mg PRN for rescue.     She presents today and unfortunately has experienced an increase in the number of migraine headaches. She denies any specific triggers but states her job is stressful; she is in process of interviewing elsewhere. She denies any missed medications, denies any changes to the location, quality, or associated symptoms of her headaches. She denies any focal symptoms, no visual or speech change, unilateral extremity  weakness/numbness, facial droop. No recent labs or imaging to review.     Current preventive therapy: Aimovig, nortriptyline 100 mg, Lamictal 100 mg daily  Current abortive treatment: Ubrelvy 100 mg, Flexeril 10 mg as needed  Failed medications: Zonegran, Zomig, riboflavin, magnesium, Medrol Dosepak, Imitrex, Lyrica, Cambia, Tegretol, amitriptyline, nortriptyline, sumatriptan, Trokendi    Review of Systems   HENT:  Negative for trouble swallowing.    Eyes:  Negative for visual disturbance.   Neurological:  Positive for headaches. Negative for facial asymmetry, speech difficulty, weakness and numbness.        The following portions of the patient's history were reviewed and updated as appropriate: allergies, current medications, past family history, past medical history, past social history, past surgical history and problem list.    Current Medications:   Current Outpatient Medications:   •  albuterol sulfate  (90 Base) MCG/ACT inhaler, Inhale 2 puffs Every 4 (Four) Hours As Needed for Wheezing., Disp: , Rfl:   •  cyclobenzaprine (FLEXERIL) 10 MG tablet, TK 1 T PO TID PRN, Disp: , Rfl: 1  •  Erenumab-aooe (Aimovig) 140 MG/ML auto-injector, ADMINISTER 1 ML UNDER THE SKIN EVERY 28 DAYS, Disp: 1 mL, Rfl: 5  •  lamoTRIgine (LaMICtal) 100 MG tablet, Take 1.5 tablets by mouth Daily., Disp: 30 tablet, Rfl: 5  •  lisinopril-hydrochlorothiazide (PRINZIDE,ZESTORETIC) 20-25 MG per tablet, Take 1 tablet by mouth Daily., Disp: , Rfl:   •  montelukast (SINGULAIR) 10 MG tablet, Take 1 tablet by mouth Daily., Disp: , Rfl: 10  •  nortriptyline (PAMELOR) 50 MG capsule, Take 2 capsules by mouth Every Night., Disp: 60 capsule, Rfl: 3  •  omeprazole (priLOSEC) 20 MG capsule, Take 1 capsule by mouth 2 (two) times a day., Disp: , Rfl:   •  Oxymetazoline HCl (NASAL SPRAY NA), into the nostril(s) as directed by provider., Disp: , Rfl:   •  potassium chloride (K-DUR) 10 MEQ CR tablet, Take 1 tablet by mouth Daily., Disp: , Rfl: 10  •   "rosuvastatin (CRESTOR) 40 MG tablet, Take  by mouth Daily., Disp: , Rfl: 2  •  SPIRIVA HANDIHALER 18 MCG per inhalation capsule, INL THE CONTENTS OF 1 C VIA INHALATION DEVICE D, Disp: , Rfl: 10  •  ubrogepant (Ubrelvy) 100 MG tablet, Take 1 tablet by mouth 1 (One) Time As Needed (for migraine headache). May repeat dose in two hours if needed., Disp: 16 tablet, Rfl: 3  •  Magnesium 400 MG tablet, , Disp: , Rfl:   •  metFORMIN (GLUCOPHAGE) 500 MG tablet, Take 1 tablet by mouth Daily., Disp: , Rfl: 10  •  methylPREDNISolone (MEDROL) 4 MG dose pack, Take as directed on package instructions., Disp: 21 tablet, Rfl: 0  •  naratriptan (AMERGE) 2.5 MG tablet, Take 1 tablet by mouth As Needed for Migraine. May repeat dose in two hours if needed. Do not take more than two doses in 24 hours., Disp: 12 tablet, Rfl: 4  •  Semaglutide, 2 MG/DOSE, (Ozempic, 2 MG/DOSE,) 8 MG/3ML solution pen-injector, Inject  under the skin into the appropriate area as directed 1 (One) Time Per Week., Disp: , Rfl:   •  simethicone (Gas-X Extra Strength) 125 MG capsule capsule, , Disp: , Rfl:   •  ubrogepant (Ubrelvy) 100 MG tablet, Take one tab for moderate to severe headache, may repeat once after 2 hours if needed, max 200 mg in 24 hours, Disp: 2 tablet, Rfl: 0  **I did not stop or change the above medications.  Patient's medication list was updated to reflect medications they have reported as currently taking, including medication changes made by other providers.    Objective  Vital Signs:  /80   Pulse 86   Ht 162.6 cm (64\")   Wt 118 kg (260 lb)   SpO2 97%   BMI 44.63 kg/m²   Body mass index is 44.63 kg/m².    Physical Exam   Physical Exam:  GENERAL: NAD, alert  HEENT: Normocephalic, atraumatic   Resp: Even and unlabored    Neurological:   MS: AOx3, recent/remote memory intact, normal attention/concentration, language intact, no neglect  CN: visual acuity grossly normal, PERRL, EOMI, no facial droop, no dysarthria  Motor: Moves all " "four extremities symmetrically and against gravity Normal tone and bulk. No tremor or abnormal movements noted.   Sensory: Intact to crude touch in all four ext.  Gait and station: Normal gait and station    Result Review:  The following data was reviewed by: CLAY Hunt on 04/10/2025:  Laboratory Results:         No results found for: \"WBC\", \"HGB\", \"HCT\", \"MCV\", \"PLT\"  No results found for: \"GLUCOSE\", \"BUN\", \"CREATININE\", \"EGFRIFNONA\", \"EGFRIFAFRI\", \"BCR\", \"K\", \"CO2\", \"CALCIUM\", \"PROTENTOTREF\", \"ALBUMIN\", \"LABIL2\", \"BILIRUBIN\", \"AST\", \"ALT\"  No results found for: \"HGBA1C\"  No results found for: \"CHOL\"  No results found for: \"HDL\"  No results found for: \"LDL\"  No results found for: \"TRIG\"  No results found for: \"RPR\"  No results found for: \"TSH\"  No results found for: \"[id number]\"               Assessment and Plan   Diagnoses and all orders for this visit:    1. Intractable chronic migraine without aura and without status migrainosus  -     naratriptan (AMERGE) 2.5 MG tablet; Take 1 tablet by mouth As Needed for Migraine. May repeat dose in two hours if needed. Do not take more than two doses in 24 hours.  Dispense: 12 tablet; Refill: 4  -     lamoTRIgine (LaMICtal) 100 MG tablet; Take 1.5 tablets by mouth Daily.  Dispense: 30 tablet; Refill: 5  -     Erenumab-aooe (Aimovig) 140 MG/ML auto-injector; ADMINISTER 1 ML UNDER THE SKIN EVERY 28 DAYS  Dispense: 1 mL; Refill: 5        We will increase lamotrigine to 150 mg daily. Continue nortriptyline 100 mg nightly and Aimovig every 28 days. Will prescribe naratriptan 2.5 mg PRN, she states this medication is helpful for nighttime migraines, allows her to rest. Educated on not using triptans with Ubrelvy. Discussed Vyepti - she would like to try increase of lamotrigine first.     We will see Rhonda back in 2-3 months, sooner if symptoms warrant.     CLAY Hunt  [facility] [place] [facility]   04/10/25       I spent 30 minutes caring for Rhonda on this date of " service. This time includes time spent by me in the following activities:preparing for the visit, obtaining and/or reviewing a separately obtained history, performing a medically appropriate examination and/or evaluation , counseling and educating the patient/family/caregiver, ordering medications, tests, or procedures, documenting information in the medical record, and care coordination  Follow Up   No follow-ups on file.  Patient was given instructions and counseling regarding her condition or for health maintenance advice. Please see specific information pulled into the AVS if appropriate.       Dictated using Dragon Dictation    As of April 2021, as required by the Federal 21st Century Cures Act, medical records (including provider notes and laboratory/imaging results) are to be made available to patient’s and/or their designees as soon as the documents are signed/resulted. While the intention is to ensure transparency and to engage the patient in their healthcare, this immediate access may create unintended consequences as this document uses language intended for communication between medical experts and diagnostic results are interpreted with the entirety of the patient’s clinical picture in mind. It is recommended that patients and/or their designees review all available information with their primary or specialist providers for explanation and guidance to avoid misinterpretation based on layperson understanding, non-medical expert opinions, or Internet searches.

## 2025-04-14 ENCOUNTER — SPECIALTY PHARMACY (OUTPATIENT)
Age: 55
End: 2025-04-14
Payer: COMMERCIAL

## 2025-04-14 NOTE — PROGRESS NOTES
Specialty Pharmacy Patient Management Program  Refill Outreach     Rhonda was contacted today regarding refills of their medication(s).    Refill Questions      Flowsheet Row Most Recent Value   Changes to allergies? No   Changes to medications? --  [Naratriptan addition, Lamictal increased]   New conditions or infections since last clinic visit No   Unplanned office visit, urgent care, ED, or hospital admission in the last 4 weeks  No   How does patient/caregiver feel medication is working? Good   Financial problems or insurance changes  No   Since the previous refill, were any specialty medication doses or scheduled injections missed or delayed?  No   Does this patient require a clinical escalation to a pharmacist? No            Delivery Questions      Flowsheet Row Most Recent Value   Delivery method UPS   Delivery address verified with patient/caregiver? Yes   Delivery address Home   Medication(s) being filled and delivered Ubrogepant (UBRELVY), Erenumab-aooe (Aimovig)   Doses left of specialty medications A few left   Copay verified? Yes   Copay amount $5.00   Copay form of payment Credit/debit on file   Delivery Date Selection 04/16/25                 Follow-up: 30 day(s)     Estrellita Casas, Pharmacy Technician  4/14/2025  16:12 EDT

## 2025-05-09 ENCOUNTER — SPECIALTY PHARMACY (OUTPATIENT)
Dept: NEUROLOGY | Facility: CLINIC | Age: 55
End: 2025-05-09
Payer: COMMERCIAL

## 2025-05-09 DIAGNOSIS — G43.709 CHRONIC MIGRAINE WITHOUT AURA WITHOUT STATUS MIGRAINOSUS, NOT INTRACTABLE: ICD-10-CM

## 2025-05-09 RX ORDER — NORTRIPTYLINE HYDROCHLORIDE 50 MG/1
100 CAPSULE ORAL NIGHTLY
Qty: 60 CAPSULE | Refills: 3 | Status: SHIPPED | OUTPATIENT
Start: 2025-05-09

## 2025-05-09 NOTE — PROGRESS NOTES
Specialty Pharmacy Patient Management Program  Refill Outreach      Rhonda is a 54 y.o. female contacted today regarding refills of her medication(s).    Specialty medication(s) and dose(s) confirmed: Aimovig 140 mg/mL and Ubrelvy 100 mg tab    Refill Questions      Flowsheet Row Most Recent Value   Changes to allergies? No   Changes to medications? No   New conditions or infections since last clinic visit No   Unplanned office visit, urgent care, ED, or hospital admission in the last 4 weeks  No   How does patient/caregiver feel medication is working? Very good   Financial problems or insurance changes  No   Since the previous refill, were any specialty medication doses or scheduled injections missed or delayed?  No   Does this patient require a clinical escalation to a pharmacist? No          Delivery Questions      Flowsheet Row Most Recent Value   Delivery method UPS   Delivery address verified with patient/caregiver? Yes   Delivery address Home   Number of medications in delivery 3   Medication(s) being filled and delivered Ubrogepant (UBRELVY), Erenumab-aooe (Aimovig), Nortriptyline HCl (PAMELOR)   Doses left of specialty medications Aimovog 1 Ubrelvy 7-8   Copay verified? Yes   Copay amount $20   Copay form of payment Credit/debit on file   Delivery Date Selection 05/13/25   Signature Required No   Do you consent to receive electronic handouts?  Yes            Follow-Up: About a month for future fills    Kamila Martin PharmD, JER   Clinical Speciality Pharmacist, Rheumatology   5/9/2025  11:58 EDT      Addendum: Added nortriptyline to be sent in order that is be delivered 5/13/25 as requested by patient. Above note has been changed to reflect.    Kamila Martin PharmD, JER   Clinical Speciality Pharmacist, Rheumatology   5/9/2025  11:58 EDT

## 2025-06-11 ENCOUNTER — SPECIALTY PHARMACY (OUTPATIENT)
Dept: NEUROLOGY | Facility: CLINIC | Age: 55
End: 2025-06-11
Payer: COMMERCIAL

## 2025-06-11 NOTE — PROGRESS NOTES
Specialty Pharmacy Refill Coordination Note     Rhonda is a 54 y.o. female contacted today regarding refills of her specialty medication(s).    Specialty medication(s) and dose(s) confirmed:   erenumab 140 mg subcutaneous every 30 days  ubrogepant 100 mg by mouth as needed for migraines, can repeat 100 mg in 2 hours if needed; max of 200 mg per 24 hours.     Changes to medications: no  Changes to insurance: no  Reviewed and verified with patient:  Allergies  Meds  Problems         Refill Questions      Flowsheet Row Most Recent Value   Changes to allergies? No   Changes to medications? No   New conditions or infections since last clinic visit No   Unplanned office visit, urgent care, ED, or hospital admission in the last 4 weeks  No   How does patient/caregiver feel medication is working? Very good   Financial problems or insurance changes  No   Since the previous refill, were any specialty medication doses or scheduled injections missed or delayed?  No   Does this patient require a clinical escalation to a pharmacist? No            Delivery Questions      Flowsheet Row Most Recent Value   Delivery method UPS   Delivery address verified with patient/caregiver? Yes   Delivery address Home   Other address preferred N/A   Number of medications in delivery 4   Medication(s) being filled and delivered Naratriptan HCl (AMERGE), Erenumab-aooe (Aimovig), Ubrogepant (UBRELVY), Nortriptyline HCl (PAMELOR)   Doses left of specialty medications Aimovog 0,  Ubrelvy 7-8   Copay verified? Yes   Copay amount $35   Copay form of payment Credit/debit on file   Delivery Date Selection 06/12/25   Signature Required No   Do you consent to receive electronic handouts?  Yes                 Follow-up: 3 weeks     Martinez Robertson RPH  6/11/2025   10:29 EDT

## 2025-06-11 NOTE — PROGRESS NOTES
Specialty Pharmacy Patient Management Program  Neurology Reassessment     Rhonda Dong is a 54 y.o. female with chronic migraine seen by a Neurology provider and enrolled in the Neurology Patient Management program offered by [facility].  A follow-up outreach was conducted, including assessment of continued therapy appropriateness, medication adherence, and side effect incidence and management for erenumab (Aimovig) and ubrogepant (Ubrelvy).     Changes to Insurance Coverage or Financial Support  No changes.       Relevant Past Medical History and Comorbidities  Relevant medical history and concomitant health conditions were discussed with the patient. The patient's chart has been reviewed for relevant past medical history and comorbid health conditions and updated as necessary.   Past Medical History:   Diagnosis Date    Arthritis     Asthma     CTS (carpal tunnel syndrome)     Diabetes mellitus     Hypertension     Migraine     Osteoarthritis     Trigeminal neuralgia      Social History     Socioeconomic History    Marital status: Single   Tobacco Use    Smoking status: Never     Passive exposure: Never    Smokeless tobacco: Never   Vaping Use    Vaping status: Never Used   Substance and Sexual Activity    Alcohol use: Yes     Alcohol/week: 1.0 standard drink of alcohol     Types: 1 Glasses of wine per week    Drug use: No    Sexual activity: Defer     Problem list reviewed by Martinez Robertson RP on 6/11/2025 at 10:26 AM      Hospitalizations and Urgent Care Since Last Assessment  ED Visits, Admissions, or Hospitalizations: none.   Urgent Office Visits: none.       Allergies  Known allergies and reactions were discussed with the patient. The patient's chart has been reviewed for allergy information and updated as necessary.   Allergies   Allergen Reactions    Shrimp Shortness Of Breath    Aspirin Unknown (See Comments)    Baclofen Unknown (See Comments)     unknown    Iodine Unknown  (See Comments)    Norplant [Levonorgestrel] Unknown (See Comments)     unknown    Other Unknown (See Comments)     BIRTH CONTROL PILLS    Latex Rash     Allergies reviewed by Martinez Robertson RP on 6/11/2025 at 10:26 AM      Lab Assessment  Labs have been reviewed. No dose adjustments are needed for the specialty medication(s) based on the labs.       Current Medication List  This medication list has been reviewed with the patient and evaluated for any interactions or necessary modifications/recommendations, and updated to include all prescription medications, OTC medications, and supplements the patient is currently taking.  This list reflects what is contained in the patient's profile, which has also been marked as reviewed to communicate to other providers it is the most up to date version of the patient's current medication therapy.     Current Outpatient Medications:     albuterol sulfate  (90 Base) MCG/ACT inhaler, Inhale 2 puffs Every 4 (Four) Hours As Needed for Wheezing., Disp: , Rfl:     cyclobenzaprine (FLEXERIL) 10 MG tablet, TK 1 T PO TID PRN, Disp: , Rfl: 1    Erenumab-aooe (Aimovig) 140 MG/ML auto-injector, Inject 1 mL under the skin into the appropriate area as directed Every 28 (Twenty-Eight) Days., Disp: 1 mL, Rfl: 5    lamoTRIgine (LaMICtal) 100 MG tablet, Take 1.5 tablets by mouth Daily., Disp: 30 tablet, Rfl: 5    lisinopril-hydrochlorothiazide (PRINZIDE,ZESTORETIC) 20-25 MG per tablet, Take 1 tablet by mouth Daily., Disp: , Rfl:     Magnesium 400 MG tablet, , Disp: , Rfl:     metFORMIN (GLUCOPHAGE) 500 MG tablet, Take 1 tablet by mouth Daily., Disp: , Rfl: 10    methylPREDNISolone (MEDROL) 4 MG dose pack, Take as directed on package instructions., Disp: 21 tablet, Rfl: 0    montelukast (SINGULAIR) 10 MG tablet, Take 1 tablet by mouth Daily., Disp: , Rfl: 10    naratriptan (AMERGE) 2.5 MG tablet, Take 1 tablet by mouth As Needed for Migraine. May repeat dose in two hours if needed. Do  not take more than two doses in 24 hours., Disp: 12 tablet, Rfl: 4    nortriptyline (PAMELOR) 50 MG capsule, Take 2 capsules by mouth Every Night., Disp: 60 capsule, Rfl: 3    omeprazole (priLOSEC) 20 MG capsule, Take 1 capsule by mouth 2 (two) times a day., Disp: , Rfl:     Oxymetazoline HCl (NASAL SPRAY NA), into the nostril(s) as directed by provider., Disp: , Rfl:     potassium chloride (K-DUR) 10 MEQ CR tablet, Take 1 tablet by mouth Daily., Disp: , Rfl: 10    rosuvastatin (CRESTOR) 40 MG tablet, Take  by mouth Daily., Disp: , Rfl: 2    Semaglutide, 2 MG/DOSE, (Ozempic, 2 MG/DOSE,) 8 MG/3ML solution pen-injector, Inject  under the skin into the appropriate area as directed 1 (One) Time Per Week., Disp: , Rfl:     simethicone (Gas-X Extra Strength) 125 MG capsule capsule, , Disp: , Rfl:     SPIRIVA HANDIHALER 18 MCG per inhalation capsule, INL THE CONTENTS OF 1 C VIA INHALATION DEVICE D, Disp: , Rfl: 10    ubrogepant (Ubrelvy) 100 MG tablet, Take 1 tablet by mouth 1 (One) Time As Needed (for migraine headache). May repeat 1 tablet in two hours if needed. Max of 2 tablets in 24 hours., Disp: 16 tablet, Rfl: 3    Medicines reviewed by Martinez Robertson [facility] on 6/11/2025 at 10:26 AM    Drug Interactions  None identified.       Adverse Drug Reactions  Medication tolerability: Tolerating with no to minimal ADRs  Medication plan: Continue therapy with normal follow-up  Plan for ADR Management: N/A, no ADR's       Adherence, Self-Administration, and Current Therapy Problems  Adherence related patient's specialty therapy was discussed with the patient. The Adherence segment of this outreach has been reviewed and updated.   Adherence Questions  Linked Medication(s) Assessed: Erenumab-aooe (Aimovig), Ubrogepant (UBRELVY)  On average, how many doses/injections does the patient miss per month?: 0  What are the identified reasons for non-adherence or missed doses? : no problems identified  What is the estimated medication  adherence level?: %  Based on the patient/caregiver response and refill history, does this patient require an MTP to track adherence improvements?: no    Additional Barriers to Patient Self-Administration: no barriers.  Methods for Supporting Patient Self-Administration: no further support needed at this time.      Recently Close Medication Therapy Problems  No medication therapy recommendations to display  Open Medication Therapy Problems  No medication therapy recommendations to display     Goals of Therapy  Goals related to the patient's specialty therapy was discussed with the patient. The Patient Goals segment of this outreach has been reviewed and updated.    Goals Addressed Today        Specialty Pharmacy General Goal      Decrease migraine frequency and severity. As of 12/16/24, patient currently on erenumab 140 mg, increasing nortriptyline dose to 100 mg nightly, and starting ubrogepant. As of 12/16, patient reports having about 4-6 headaches a month, decreased from the previous 12 headaches she reported. She reports an average migraine severity of ~8/10 on a pain scale from 0-10.     6/11/25: Patient reports having about the same 4-6 headache days a month while on erenumab with about a 50% decrease in average headache symptoms. Ubrogepant completely alleviates migraine symptoms the majority of the time when taken.                Quality of Life Assessment   Quality of Life related to the patient's enrollment in the patient management program and services provided was discussed with the patient. The QOL segment of this outreach has been reviewed and updated.   Quality of Life Improvement Scale: 8-Moderately better      Reassessment Plan & Follow-Up  Medication Therapy Changes: No changes, continuing erenumab, nortriptyline, and lamotrigine for prevention and naratriptan and ubrogepant for acute treatment of migraines.  Related Plans, Therapy Recommendations, or Issues to Be Addressed: Nothing further  to be addressed at this time.   Pharmacist to perform regular reassessments no more than (6) months from the previous assessment.  Care Coordinator to set up future refill outreaches, coordinate prescription delivery, and escalate clinical questions to pharmacist.     Attestation  Therapeutic appropriateness: Appropriate  I attest the patient was actively involved in and has agreed to the above plan of care. If the prescribed therapy is at any point deemed not appropriate based on the current or future assessments, a consultation will be initiated with the patient's specialty care provider to determine the best course of action. The revised plan of therapy will be documented along with any additional patient education provided. Discussed aforementioned material with patient by phone.    Martinez Robertson RPH  6/11/2025  10:31 EDT

## 2025-06-16 ENCOUNTER — TELEPHONE (OUTPATIENT)
Dept: NEUROLOGY | Facility: CLINIC | Age: 55
End: 2025-06-16
Payer: COMMERCIAL

## 2025-06-16 NOTE — TELEPHONE ENCOUNTER
Spoke to Pt to r/s appt on 7/16 due to Laila Tan being out of office. Pt agreed to r/s for 7/23 @ 8:00.      SENT Letter

## 2025-07-08 ENCOUNTER — SPECIALTY PHARMACY (OUTPATIENT)
Dept: NEUROLOGY | Facility: CLINIC | Age: 55
End: 2025-07-08
Payer: COMMERCIAL

## 2025-07-08 NOTE — PROGRESS NOTES
Specialty Pharmacy Patient Management Program  MyChart Refill Outreach       Rhonda was contacted today regarding refills of their medication(s).    MyChart Questionnaire Responses      Flowsheet Row Questionnaire Series Submission from 7/7/2025 in Initial Department with Tripp, Generic Provider   Changes to allergies? No    Changes to medications? No    New conditions or infections since last clinic visit No    Unplanned office visit, urgent care, ED, or hospital admission in the last 4 weeks  No    How does patient/caregiver feel medication is working? Good    Financial problems or insurance changes  No    Since the previous refill, were any specialty medication doses or scheduled injections missed or delayed?  No    Delivery address Home    Doses left of specialty medications Do not know    Copay verified? Yes    Delivery Date Selection 07/15/25             Refill Questions      Flowsheet Row Most Recent Value   Does this patient require a clinical escalation to a pharmacist? No            Delivery Questions      Flowsheet Row Most Recent Value   Delivery method [employer]   Delivery address verified with patient/caregiver? Yes   Other address preferred N/A   Number of medications in delivery 2   Medication(s) being filled and delivered Erenumab-aooe (Aimovig), Ubrogepant (UBRELVY)   Copay verified? Yes   Copay amount $5   Copay form of payment Credit/debit on file   Signature Required No   Do you consent to receive electronic handouts?  Yes                   Follow-up: 21 day(s)     Leila Winter, Pharmacy Technician  7/8/2025  08:43 EDT

## 2025-07-23 ENCOUNTER — OFFICE VISIT (OUTPATIENT)
Dept: NEUROLOGY | Facility: CLINIC | Age: 55
End: 2025-07-23
Payer: COMMERCIAL

## 2025-07-23 VITALS
HEART RATE: 89 BPM | SYSTOLIC BLOOD PRESSURE: 110 MMHG | OXYGEN SATURATION: 96 % | DIASTOLIC BLOOD PRESSURE: 72 MMHG | WEIGHT: 273.4 LBS | HEIGHT: 64 IN | BODY MASS INDEX: 46.67 KG/M2

## 2025-07-23 DIAGNOSIS — G43.709 CHRONIC MIGRAINE WITHOUT AURA WITHOUT STATUS MIGRAINOSUS, NOT INTRACTABLE: Primary | ICD-10-CM

## 2025-07-23 DIAGNOSIS — G43.719 INTRACTABLE CHRONIC MIGRAINE WITHOUT AURA AND WITHOUT STATUS MIGRAINOSUS: ICD-10-CM

## 2025-07-23 RX ORDER — LAMOTRIGINE 100 MG/1
150 TABLET ORAL DAILY
Qty: 120 TABLET | Refills: 1 | Status: SHIPPED | OUTPATIENT
Start: 2025-07-23

## 2025-07-23 NOTE — LETTER
2025     [facility]  [address] [address]    Patient: Rhonda Dong   YOB: 1970   Date of Visit: 2025     Dear [facility]:       Thank you for referring Rhonda Dong to me for evaluation. Below are the relevant portions of my assessment and plan of care.    If you have questions, please do not hesitate to call me. I look forward to following Rhonda along with you.         Sincerely,        [facility]        CC: No Recipients    Laila Tan APRN  [phone]  Sign when Signing Visit  DOS: 2025  NAME: Rhonda Dong   : 1970  PCP: Mellisa Larry APRN    Chief Complaint   Patient presents with   • Migraine     F/u      SUBJECTIVE  Neurological Problem:  54 y.o. right-handed female with a past medical history of TMJ, DM, HTN, HLD, renal insufficiency,  asthma, chronic right knee pain and h/o uveitis, b/l cataracts and CTS (s/p surgery on the right). They are seen in follow up today for chronic migraine headaches. A summary of the history was taken from the previous note with additions/modifications as indicated. She is unaccompanied.    Interval History:   **For detailed interval history see progress note dated 12/10/24.    Ms. Dong follows with our clinic for chronic migraine headaches.  She has been maintained on Aimovig 140 mg every 28 days, nortriptyline 100 mg nightly and Lamictal 120 mg daily for migraine prevention. Ubrelvy 100 mg PRN, naratriptan 2.5 mg PRN and Flexeril 10 mg PRN for rescue.     She presents today and reports one migraine headache since her last office visit. Her rescue medications aborted the migraine. She denies changes to her headache location or presentation, no change to associated symptoms. She denies any focal symptoms, no visual or speech change, unilateral extremity weakness/numbness, facial droop. No recent labs or imaging to review.     Current preventive therapy: Aimovig  Q28 days, nortriptyline 100 mg, Lamictal 150 mg daily  Current abortive treatment: Ubrelvy 100 mg, nartriptan 2.5 mg Flexeril 10 mg PRN  Failed medications: Zonegran, Zomig, riboflavin, magnesium, Medrol Dosepak, Imitrex, Lyrica, Cambia, Tegretol, amitriptyline, sumatriptan, Trokendi    Review of Systems   HENT:  Negative for trouble swallowing.    Eyes:  Negative for visual disturbance.   Neurological:  Positive for headaches. Negative for facial asymmetry, speech difficulty, weakness and numbness.        The following portions of the patient's history were reviewed and updated as appropriate: allergies, current medications, past family history, past medical history, past social history, past surgical history and problem list.    Current Medications:   Current Outpatient Medications:   •  albuterol sulfate  (90 Base) MCG/ACT inhaler, Inhale 2 puffs Every 4 (Four) Hours As Needed for Wheezing., Disp: , Rfl:   •  cyclobenzaprine (FLEXERIL) 10 MG tablet, TK 1 T PO TID PRN, Disp: , Rfl: 1  •  empagliflozin (JARDIANCE) 10 MG tablet tablet, Take  by mouth Daily., Disp: , Rfl:   •  Erenumab-aooe (Aimovig) 140 MG/ML auto-injector, Inject 1 mL under the skin into the appropriate area as directed Every 28 (Twenty-Eight) Days., Disp: 1 mL, Rfl: 5  •  lamoTRIgine (LaMICtal) 100 MG tablet, Take 1.5 tablets by mouth Daily., Disp: 120 tablet, Rfl: 1  •  lisinopril-hydrochlorothiazide (PRINZIDE,ZESTORETIC) 20-25 MG per tablet, Take 1 tablet by mouth Daily., Disp: , Rfl:   •  montelukast (SINGULAIR) 10 MG tablet, Take 1 tablet by mouth Daily., Disp: , Rfl: 10  •  naratriptan (AMERGE) 2.5 MG tablet, Take 1 tablet by mouth As Needed for Migraine. May repeat dose in two hours if needed. Do not take more than two doses in 24 hours., Disp: 12 tablet, Rfl: 4  •  nortriptyline (PAMELOR) 50 MG capsule, Take 2 capsules by mouth Every Night., Disp: 60 capsule, Rfl: 3  •  omeprazole (priLOSEC) 20 MG capsule, Take 1 capsule by mouth 2  "(two) times a day., Disp: , Rfl:   •  Oxymetazoline HCl (NASAL SPRAY NA), into the nostril(s) as directed by provider., Disp: , Rfl:   •  potassium chloride (K-DUR) 10 MEQ CR tablet, Take 1 tablet by mouth Daily., Disp: , Rfl: 10  •  rosuvastatin (CRESTOR) 40 MG tablet, Take  by mouth Daily., Disp: , Rfl: 2  •  simethicone (Gas-X Extra Strength) 125 MG capsule capsule, , Disp: , Rfl:   •  SPIRIVA HANDIHALER 18 MCG per inhalation capsule, INL THE CONTENTS OF 1 C VIA INHALATION DEVICE D, Disp: , Rfl: 10  •  ubrogepant (Ubrelvy) 100 MG tablet, Take 1 tablet by mouth 1 (One) Time As Needed (for migraine headache). May repeat 1 tablet in two hours if needed. Max of 2 tablets in 24 hours., Disp: 16 tablet, Rfl: 5  •  Magnesium 400 MG tablet, , Disp: , Rfl:   •  metFORMIN (GLUCOPHAGE) 500 MG tablet, Take 1 tablet by mouth Daily. (Patient not taking: Reported on 7/23/2025), Disp: , Rfl: 10  •  methylPREDNISolone (MEDROL) 4 MG dose pack, Take as directed on package instructions. (Patient not taking: Reported on 7/23/2025), Disp: 21 tablet, Rfl: 0  •  Semaglutide, 2 MG/DOSE, (Ozempic, 2 MG/DOSE,) 8 MG/3ML solution pen-injector, Inject  under the skin into the appropriate area as directed 1 (One) Time Per Week. (Patient not taking: Reported on 7/23/2025), Disp: , Rfl:   **I did not stop or change the above medications.  Patient's medication list was updated to reflect medications they have reported as currently taking, including medication changes made by other providers.    Objective  Vital Signs:  /72   Pulse 89   Ht 162.6 cm (64\")   Wt 124 kg (273 lb 6.4 oz)   SpO2 96%   BMI 46.93 kg/m²   Body mass index is 46.93 kg/m².    Physical Exam   Physical Exam:  GENERAL: NAD, alert    Neurological:   MS: AOx3, recent/remote memory intact, normal attention/concentration, language intact, no neglect  CN: visual acuity grossly normal, PERRL, EOMI, no facial droop, no dysarthria  Motor: Moves all four extremities " "symmetrically and against gravity Normal tone and bulk. No tremor or abnormal movements noted.   Gait and station: Normal gait and station    Result Review:  The following data was reviewed by: CLAY Hunt on 04/10/2025:  Laboratory Results:         No results found for: \"WBC\", \"HGB\", \"HCT\", \"MCV\", \"PLT\"  No results found for: \"GLUCOSE\", \"BUN\", \"CREATININE\", \"EGFRIFNONA\", \"EGFRIFAFRI\", \"BCR\", \"K\", \"CO2\", \"CALCIUM\", \"PROTENTOTREF\", \"ALBUMIN\", \"LABIL2\", \"BILIRUBIN\", \"AST\", \"ALT\"  No results found for: \"HGBA1C\"  No results found for: \"CHOL\"  No results found for: \"HDL\"  No results found for: \"LDL\"  No results found for: \"TRIG\"  No results found for: \"RPR\"  No results found for: \"TSH\"  No results found for: \"[id number]\"               Assessment and Plan   Diagnoses and all orders for this visit:    1. Chronic migraine without aura without status migrainosus, not intractable (Primary)  -     ubrogepant (Ubrelvy) 100 MG tablet; Take 1 tablet by mouth 1 (One) Time As Needed (for migraine headache). May repeat 1 tablet in two hours if needed. Max of 2 tablets in 24 hours.  Dispense: 16 tablet; Refill: 5  -     lamoTRIgine (LaMICtal) 100 MG tablet; Take 1.5 tablets by mouth Daily.  Dispense: 120 tablet; Refill: 1    2. Intractable chronic migraine without aura and without status migrainosus      Continue lamotrigine 150 mg daily, nortriptyline 100 mg nightly and Aimovig every 28 days. Naratriptan 2.5 mg PRN and Ubrelvy 100 mg PRN for rescue. Reminded on concurrent triptan and CGRP use.     We will see Rhonda back in 6-8 months, sooner if symptoms warrant.     CLAY Hunt  [facility]   07/23/25       I spent 20 minutes caring for Rhonda on this date of service. This time includes time spent by me in the following activities:preparing for the visit, obtaining and/or reviewing a separately obtained history, performing a medically appropriate examination and/or evaluation , counseling and educating the " patient/family/caregiver, ordering medications, tests, or procedures, documenting information in the medical record, and care coordination  Follow Up   No follow-ups on file.  Patient was given instructions and counseling regarding her condition or for health maintenance advice. Please see specific information pulled into the AVS if appropriate.       Dictated using Dragon Dictation    As of April 2021, as required by the Federal [employer] Systems Cures Act, medical records (including provider notes and laboratory/imaging results) are to be made available to patient’s and/or their designees as soon as the documents are signed/resulted. While the intention is to ensure transparency and to engage the patient in their healthcare, this immediate access may create unintended consequences as this document uses language intended for communication between medical experts and diagnostic results are interpreted with the entirety of the patient’s clinical picture in mind. It is recommended that patients and/or their designees review all available information with their primary or specialist providers for explanation and guidance to avoid misinterpretation based on layperson understanding, non-medical expert opinions, or Internet searches.

## 2025-07-23 NOTE — PROGRESS NOTES
DOS: 2025  NAME: Rhonda Dong   : 1970  PCP: Mellisa Larry APRN    Chief Complaint   Patient presents with    Migraine     F/u      SUBJECTIVE  Neurological Problem:  54 y.o. right-handed female with a past medical history of TMJ, DM, HTN, HLD, renal insufficiency,  asthma, chronic right knee pain and h/o uveitis, b/l cataracts and CTS (s/p surgery on the right). They are seen in follow up today for chronic migraine headaches. A summary of the history was taken from the previous note with additions/modifications as indicated. She is unaccompanied.    Interval History:   **For detailed interval history see progress note dated 12/10/24.    Ms. Dong follows with our clinic for chronic migraine headaches.  She has been maintained on Aimovig 140 mg every 28 days, nortriptyline 100 mg nightly and Lamictal 120 mg daily for migraine prevention. Ubrelvy 100 mg PRN, naratriptan 2.5 mg PRN and Flexeril 10 mg PRN for rescue.     She presents today and reports one migraine headache since her last office visit. Her rescue medications aborted the migraine. She denies changes to her headache location or presentation, no change to associated symptoms. She denies any focal symptoms, no visual or speech change, unilateral extremity weakness/numbness, facial droop. No recent labs or imaging to review. She mentions a diet change; she is on an antiinflammatory diet and feels this has helped in migraine reduction as well. She has also lost approximately 20 lbs with this change.     Current preventive therapy: Aimovig Q28 days, nortriptyline 100 mg, Lamictal 150 mg daily  Current abortive treatment: Ubrelvy 100 mg, nartriptan 2.5 mg Flexeril 10 mg PRN  Failed medications: Zonegran, Zomig, riboflavin, magnesium, Medrol Dosepak, Imitrex, Lyrica, Cambia, Tegretol, amitriptyline, sumatriptan, Trokendi    Review of Systems   HENT:  Negative for trouble swallowing.    Eyes:  Negative for visual disturbance.    Neurological:  Positive for headaches. Negative for facial asymmetry, speech difficulty, weakness and numbness.        The following portions of the patient's history were reviewed and updated as appropriate: allergies, current medications, past family history, past medical history, past social history, past surgical history and problem list.    Current Medications:   Current Outpatient Medications:     albuterol sulfate  (90 Base) MCG/ACT inhaler, Inhale 2 puffs Every 4 (Four) Hours As Needed for Wheezing., Disp: , Rfl:     cyclobenzaprine (FLEXERIL) 10 MG tablet, TK 1 T PO TID PRN, Disp: , Rfl: 1    empagliflozin (JARDIANCE) 10 MG tablet tablet, Take  by mouth Daily., Disp: , Rfl:     Erenumab-aooe (Aimovig) 140 MG/ML auto-injector, Inject 1 mL under the skin into the appropriate area as directed Every 28 (Twenty-Eight) Days., Disp: 1 mL, Rfl: 5    lamoTRIgine (LaMICtal) 100 MG tablet, Take 1.5 tablets by mouth Daily., Disp: 120 tablet, Rfl: 1    lisinopril-hydrochlorothiazide (PRINZIDE,ZESTORETIC) 20-25 MG per tablet, Take 1 tablet by mouth Daily., Disp: , Rfl:     montelukast (SINGULAIR) 10 MG tablet, Take 1 tablet by mouth Daily., Disp: , Rfl: 10    naratriptan (AMERGE) 2.5 MG tablet, Take 1 tablet by mouth As Needed for Migraine. May repeat dose in two hours if needed. Do not take more than two doses in 24 hours., Disp: 12 tablet, Rfl: 4    nortriptyline (PAMELOR) 50 MG capsule, Take 2 capsules by mouth Every Night., Disp: 60 capsule, Rfl: 3    omeprazole (priLOSEC) 20 MG capsule, Take 1 capsule by mouth 2 (two) times a day., Disp: , Rfl:     Oxymetazoline HCl (NASAL SPRAY NA), into the nostril(s) as directed by provider., Disp: , Rfl:     potassium chloride (K-DUR) 10 MEQ CR tablet, Take 1 tablet by mouth Daily., Disp: , Rfl: 10    rosuvastatin (CRESTOR) 40 MG tablet, Take  by mouth Daily., Disp: , Rfl: 2    simethicone (Gas-X Extra Strength) 125 MG capsule capsule, , Disp: , Rfl:     SPIRIVA  "HANDIHALER 18 MCG per inhalation capsule, INL THE CONTENTS OF 1 C VIA INHALATION DEVICE D, Disp: , Rfl: 10    ubrogepant (Ubrelvy) 100 MG tablet, Take 1 tablet by mouth 1 (One) Time As Needed (for migraine headache). May repeat 1 tablet in two hours if needed. Max of 2 tablets in 24 hours., Disp: 16 tablet, Rfl: 5    Magnesium 400 MG tablet, , Disp: , Rfl:     metFORMIN (GLUCOPHAGE) 500 MG tablet, Take 1 tablet by mouth Daily. (Patient not taking: Reported on 7/23/2025), Disp: , Rfl: 10    methylPREDNISolone (MEDROL) 4 MG dose pack, Take as directed on package instructions. (Patient not taking: Reported on 7/23/2025), Disp: 21 tablet, Rfl: 0    Semaglutide, 2 MG/DOSE, (Ozempic, 2 MG/DOSE,) 8 MG/3ML solution pen-injector, Inject  under the skin into the appropriate area as directed 1 (One) Time Per Week. (Patient not taking: Reported on 7/23/2025), Disp: , Rfl:   **I did not stop or change the above medications.  Patient's medication list was updated to reflect medications they have reported as currently taking, including medication changes made by other providers.    Objective   Vital Signs:  /72   Pulse 89   Ht 162.6 cm (64\")   Wt 124 kg (273 lb 6.4 oz)   SpO2 96%   BMI 46.93 kg/m²   Body mass index is 46.93 kg/m².    Physical Exam   Physical Exam:  GENERAL: NAD, alert    Neurological:   MS: AOx3, recent/remote memory intact, normal attention/concentration, language intact, no neglect  CN: visual acuity grossly normal, PERRL, EOMI, no facial droop, no dysarthria  Motor: Moves all four extremities symmetrically and against gravity Normal tone and bulk. No tremor or abnormal movements noted.   Gait and station: Normal gait and station    Result Review :  The following data was reviewed by: CLAY Hunt on 04/10/2025:  Laboratory Results:         No results found for: \"WBC\", \"HGB\", \"HCT\", \"MCV\", \"PLT\"  No results found for: \"GLUCOSE\", \"BUN\", \"CREATININE\", \"EGFRIFNONA\", \"EGFRIFAFRI\", \"BCR\", \"K\", " "\"CO2\", \"CALCIUM\", \"PROTENTOTREF\", \"ALBUMIN\", \"LABIL2\", \"BILIRUBIN\", \"AST\", \"ALT\"  No results found for: \"HGBA1C\"  No results found for: \"CHOL\"  No results found for: \"HDL\"  No results found for: \"LDL\"  No results found for: \"TRIG\"  No results found for: \"RPR\"  No results found for: \"TSH\"  No results found for: \"VYJOOVTL21\"               Assessment and Plan   Diagnoses and all orders for this visit:    1. Chronic migraine without aura without status migrainosus, not intractable (Primary)  -     ubrogepant (Ubrelvy) 100 MG tablet; Take 1 tablet by mouth 1 (One) Time As Needed (for migraine headache). May repeat 1 tablet in two hours if needed. Max of 2 tablets in 24 hours.  Dispense: 16 tablet; Refill: 5  -     lamoTRIgine (LaMICtal) 100 MG tablet; Take 1.5 tablets by mouth Daily.  Dispense: 120 tablet; Refill: 1    2. Intractable chronic migraine without aura and without status migrainosus      Continue lamotrigine 150 mg daily, nortriptyline 100 mg nightly and Aimovig every 28 days. Naratriptan 2.5 mg PRN and Ubrelvy 100 mg PRN for rescue. Reminded on concurrent triptan and CGRP use.     We will see Rhonda back in 6-8 months, sooner if symptoms warrant.     CLAY Hunt  [facility]   07/23/25       I spent 20 minutes caring for Rhonda on this date of service. This time includes time spent by me in the following activities:preparing for the visit, obtaining and/or reviewing a separately obtained history, performing a medically appropriate examination and/or evaluation , counseling and educating the patient/family/caregiver, ordering medications, tests, or procedures, documenting information in the medical record, and care coordination  Follow Up   No follow-ups on file.  Patient was given instructions and counseling regarding her condition or for health maintenance advice. Please see specific information pulled into the AVS if appropriate.       Dictated using Dragon Dictation    As of April 2021, as " required by the Federal 21st Century Cures Act, medical records (including provider notes and laboratory/imaging results) are to be made available to patient’s and/or their designees as soon as the documents are signed/resulted. While the intention is to ensure transparency and to engage the patient in their healthcare, this immediate access may create unintended consequences as this document uses language intended for communication between medical experts and diagnostic results are interpreted with the entirety of the patient’s clinical picture in mind. It is recommended that patients and/or their designees review all available information with their primary or specialist providers for explanation and guidance to avoid misinterpretation based on layperson understanding, non-medical expert opinions, or Internet searches.

## 2025-08-18 ENCOUNTER — SPECIALTY PHARMACY (OUTPATIENT)
Dept: NEUROLOGY | Facility: CLINIC | Age: 55
End: 2025-08-18
Payer: COMMERCIAL